# Patient Record
Sex: MALE | Race: WHITE | NOT HISPANIC OR LATINO | Employment: OTHER | URBAN - METROPOLITAN AREA
[De-identification: names, ages, dates, MRNs, and addresses within clinical notes are randomized per-mention and may not be internally consistent; named-entity substitution may affect disease eponyms.]

---

## 2019-07-17 ENCOUNTER — OFFICE VISIT (OUTPATIENT)
Dept: CARDIOLOGY | Facility: CLINIC | Age: 84
End: 2019-07-17

## 2019-07-17 VITALS
DIASTOLIC BLOOD PRESSURE: 64 MMHG | HEIGHT: 69 IN | WEIGHT: 172 LBS | SYSTOLIC BLOOD PRESSURE: 118 MMHG | HEART RATE: 71 BPM | BODY MASS INDEX: 25.48 KG/M2

## 2019-07-17 DIAGNOSIS — E78.2 MIXED HYPERLIPIDEMIA: ICD-10-CM

## 2019-07-17 DIAGNOSIS — I10 ESSENTIAL HYPERTENSION: Primary | ICD-10-CM

## 2019-07-17 DIAGNOSIS — E11.69 TYPE 2 DIABETES MELLITUS WITH OTHER SPECIFIED COMPLICATION, WITHOUT LONG-TERM CURRENT USE OF INSULIN (HCC): ICD-10-CM

## 2019-07-17 DIAGNOSIS — I49.1 PREMATURE ATRIAL CONTRACTIONS: ICD-10-CM

## 2019-07-17 PROBLEM — E11.9 DIABETES MELLITUS (HCC): Status: ACTIVE | Noted: 2019-07-17

## 2019-07-17 PROCEDURE — 93000 ELECTROCARDIOGRAM COMPLETE: CPT | Performed by: INTERNAL MEDICINE

## 2019-07-17 PROCEDURE — 99214 OFFICE O/P EST MOD 30 MIN: CPT | Performed by: INTERNAL MEDICINE

## 2019-07-17 RX ORDER — ROSUVASTATIN CALCIUM 20 MG/1
20 TABLET, COATED ORAL DAILY
COMMUNITY
Start: 2018-01-24

## 2019-07-17 RX ORDER — ASPIRIN 81 MG/1
81 TABLET ORAL DAILY
COMMUNITY
Start: 2017-01-26

## 2019-07-17 RX ORDER — TAMSULOSIN HYDROCHLORIDE 0.4 MG/1
0.4 CAPSULE ORAL DAILY
COMMUNITY
End: 2021-04-21

## 2019-07-17 RX ORDER — LISINOPRIL 5 MG/1
5 TABLET ORAL DAILY
COMMUNITY
Start: 2014-01-20 | End: 2022-04-27

## 2019-07-17 NOTE — PROGRESS NOTES
Date of Office Visit: 2019  Encounter Provider: Brayden Merino MD  Place of Service: [facility] CARDIOLOGY [place]  Patient Name: Pipe Pham  :1935  Joseph Garcia MD    Chief Complaint   Patient presents with   • Hypertension     1 year follow up     History of Present Illness:    I am pleased to see Mr. Pham in my office today as a follow-up.    As you know, patient is 84 years old white gentleman whose past medical history significant for hypertension, hyperlipidemia, diabetes mellitus, who came today for follow-up.    In , patient underwent cardiac catheterization which showed no significant CAD and LV function was normal.  Patient does not have previous history of CVA, congestive heart failure.  He does not abuse alcohol and tobacco.  In , patient underwent stress test in which he walked for total of 10 minutes and did not have any ischemia or myocardial infarction.  Patient was noted to have isolated PACs.    Since the previous visit, patient is doing fairly well.  He overall has slowed down a little bit because of his age.  He still does push lawnmower and does not reproduce any symptom of chest pain or tightness or heaviness.  No orthopnea, PND, syncope or presyncope.  No leg edema noted.    EKG showed normal sinus rhythm.  Isolated PACs noted.    Overall, I am very pleased with the patient progress.  His blood pressure is very well controlled.  At this stage I will continue current treatment.  I will see the patient as needed and patient is advised to follow-up with PCP.          Past Medical History:   Diagnosis Date   • Diabetes mellitus (CMS/HCC)    • Hyperlipidemia    • Hypertension    • Palpitations          Past Surgical History:   Procedure Laterality Date   • APPENDECTOMY             Current Outpatient Medications:   •  aspirin (ASPIR) 81 MG EC tablet, 81 mg Daily., Disp: , Rfl:   •  lisinopril (PRINIVIL,ZESTRIL) 5 MG tablet, 5 mg Daily., Disp: ,  "Rfl:   •  metFORMIN (GLUCOPHAGE) 500 MG tablet, 500 mg 2 (Two) Times a Day., Disp: , Rfl:   •  rosuvastatin (CRESTOR) 20 MG tablet, 20 mg Daily., Disp: , Rfl:   •  tamsulosin (FLOMAX) 0.4 MG capsule 24 hr capsule, 0.4 mg Daily., Disp: , Rfl:       Social History     Socioeconomic History   • Marital status:      Spouse name: Not on file   • Number of children: Not on file   • Years of education: Not on file   • Highest education level: Not on file   Tobacco Use   • Smoking status: Never Smoker   • Smokeless tobacco: Never Used   Substance and Sexual Activity   • Alcohol use: No     Frequency: Never   • Drug use: No   • Sexual activity: No         Review of Systems   Constitution: Negative for chills and fever.   HENT: Negative for ear discharge and nosebleeds.    Eyes: Negative for discharge and redness.   Cardiovascular: Negative for chest pain, orthopnea, palpitations, paroxysmal nocturnal dyspnea and syncope.   Respiratory: Negative for cough, shortness of breath and wheezing.    Endocrine: Negative for heat intolerance.   Skin: Negative for rash.   Musculoskeletal: Negative for arthritis and myalgias.   Gastrointestinal: Negative for abdominal pain, melena, nausea and vomiting.   Genitourinary: Negative for dysuria and hematuria.   Neurological: Negative for dizziness, light-headedness, numbness and tremors.   Psychiatric/Behavioral: Negative for depression. The patient is not nervous/anxious.        Procedures      ECG 12 Lead  Date/Time: 7/17/2019 10:58 AM  Performed by: Brayden Merino MD  Authorized by: Brayden Merino MD   Rhythm: sinus rhythm  Ectopy: atrial premature contractions  ST Segments: ST segments normal  T Waves: T waves normal    Clinical impression: normal ECG            ECG 12 Lead    (Results Pending)           Objective:    /64   Pulse 71   Ht 175.3 cm (69\")   Wt 78 kg (172 lb)   BMI 25.40 kg/m²         Physical Exam   Constitutional: He is oriented to person, place, and time. " He appears well-developed and well-nourished.   HENT:   Head: Normocephalic and atraumatic.   Eyes: Right eye exhibits no discharge. No scleral icterus.   Neck: No thyromegaly present.   Cardiovascular: Normal rate, regular rhythm and normal heart sounds. Exam reveals no gallop and no friction rub.   No murmur heard.  Pulmonary/Chest: Effort normal and breath sounds normal. No respiratory distress. He has no wheezes. He has no rales.   Abdominal: There is no tenderness.   Musculoskeletal: He exhibits no edema.   Lymphadenopathy:     He has no cervical adenopathy.   Neurological: He is alert and oriented to person, place, and time.   Skin: No rash noted. No erythema.   Psychiatric: He has a normal mood and affect.           Assessment:       Diagnosis Plan   1. Essential hypertension     2. Premature atrial contractions     3. Mixed hyperlipidemia     4. Type 2 diabetes mellitus with other specified complication, without long-term current use of insulin (CMS/[facility])              Plan:       I would recommend that patient continue current treatment.  His blood pressure is very well controlled.  I would recommend that patient should follow-up with PCP.  I will see the patient as needed.

## 2020-06-17 ENCOUNTER — TRANSCRIBE ORDERS (OUTPATIENT)
Dept: ADMINISTRATIVE | Facility: HOSPITAL | Age: 85
End: 2020-06-17

## 2020-06-17 ENCOUNTER — HOSPITAL ENCOUNTER (OUTPATIENT)
Dept: CARDIOLOGY | Facility: HOSPITAL | Age: 85
Discharge: HOME | End: 2020-06-17
Admitting: INTERNAL MEDICINE

## 2020-06-17 DIAGNOSIS — I82.409 ACUTE EMBOLISM AND THROMBOSIS OF DEEP VEIN OF LOWER EXTREMITY, UNSPECIFIED LATERALITY (HCC): ICD-10-CM

## 2020-06-17 DIAGNOSIS — I82.409 ACUTE EMBOLISM AND THROMBOSIS OF DEEP VEIN OF LOWER EXTREMITY, UNSPECIFIED LATERALITY (HCC): Primary | ICD-10-CM

## 2020-06-17 LAB
BH CV LOWER VASCULAR LEFT COMMON FEMORAL AUGMENT: NORMAL
BH CV LOWER VASCULAR LEFT COMMON FEMORAL COMPETENT: NORMAL
BH CV LOWER VASCULAR LEFT COMMON FEMORAL COMPRESS: NORMAL
BH CV LOWER VASCULAR LEFT COMMON FEMORAL PHASIC: NORMAL
BH CV LOWER VASCULAR LEFT COMMON FEMORAL SPONT: NORMAL
BH CV LOWER VASCULAR RIGHT COMMON FEMORAL AUGMENT: NORMAL
BH CV LOWER VASCULAR RIGHT COMMON FEMORAL COMPETENT: NORMAL
BH CV LOWER VASCULAR RIGHT COMMON FEMORAL COMPRESS: NORMAL
BH CV LOWER VASCULAR RIGHT COMMON FEMORAL PHASIC: NORMAL
BH CV LOWER VASCULAR RIGHT COMMON FEMORAL SPONT: NORMAL
BH CV LOWER VASCULAR RIGHT DISTAL FEMORAL COMPRESS: NORMAL
BH CV LOWER VASCULAR RIGHT GASTRONEMIUS COMPRESS: NORMAL
BH CV LOWER VASCULAR RIGHT GREATER SAPH AK COMPRESS: NORMAL
BH CV LOWER VASCULAR RIGHT GREATER SAPH BK COMPRESS: NORMAL
BH CV LOWER VASCULAR RIGHT LESSER SAPH COMPRESS: NORMAL
BH CV LOWER VASCULAR RIGHT MID FEMORAL AUGMENT: NORMAL
BH CV LOWER VASCULAR RIGHT MID FEMORAL COMPETENT: NORMAL
BH CV LOWER VASCULAR RIGHT MID FEMORAL COMPRESS: NORMAL
BH CV LOWER VASCULAR RIGHT MID FEMORAL PHASIC: NORMAL
BH CV LOWER VASCULAR RIGHT MID FEMORAL SPONT: NORMAL
BH CV LOWER VASCULAR RIGHT PERONEAL COMPRESS: NORMAL
BH CV LOWER VASCULAR RIGHT POPLITEAL AUGMENT: NORMAL
BH CV LOWER VASCULAR RIGHT POPLITEAL COMPETENT: NORMAL
BH CV LOWER VASCULAR RIGHT POPLITEAL COMPRESS: NORMAL
BH CV LOWER VASCULAR RIGHT POPLITEAL PHASIC: NORMAL
BH CV LOWER VASCULAR RIGHT POPLITEAL SPONT: NORMAL
BH CV LOWER VASCULAR RIGHT POSTERIOR TIBIAL COMPRESS: NORMAL
BH CV LOWER VASCULAR RIGHT PROXIMAL FEMORAL COMPRESS: NORMAL
BH CV LOWER VASCULAR RIGHT SAPHENOFEMORAL JUNCTION AUGMENT: NORMAL
BH CV LOWER VASCULAR RIGHT SAPHENOFEMORAL JUNCTION COMPETENT: NORMAL
BH CV LOWER VASCULAR RIGHT SAPHENOFEMORAL JUNCTION COMPRESS: NORMAL
BH CV LOWER VASCULAR RIGHT SAPHENOFEMORAL JUNCTION PHASIC: NORMAL
BH CV LOWER VASCULAR RIGHT SAPHENOFEMORAL JUNCTION SPONT: NORMAL
BH CV LOWER VASCULAR RIGHT VARICOSITY AK COMPRESS: NORMAL
BH CV LOWER VASCULAR RIGHT VARICOSITY BK COMPRESS: NORMAL

## 2020-06-17 PROCEDURE — 93971 EXTREMITY STUDY: CPT

## 2020-10-07 ENCOUNTER — TRANSCRIBE ORDERS (OUTPATIENT)
Dept: ADMINISTRATIVE | Facility: HOSPITAL | Age: 85
End: 2020-10-07

## 2020-10-07 ENCOUNTER — LAB (OUTPATIENT)
Dept: LAB | Facility: HOSPITAL | Age: 85
End: 2020-10-07

## 2020-10-07 DIAGNOSIS — M31.6 GCA (GIANT CELL ARTERITIS) (HCC): ICD-10-CM

## 2020-10-07 DIAGNOSIS — M31.6 GCA (GIANT CELL ARTERITIS) (HCC): Primary | ICD-10-CM

## 2020-10-07 LAB
BASOPHILS # BLD AUTO: 0 10*3/MM3 (ref 0–0.2)
BASOPHILS NFR BLD AUTO: 0.8 % (ref 0–1.5)
CRP SERPL-MCNC: 0.04 MG/DL (ref 0–0.5)
DEPRECATED RDW RBC AUTO: 44.2 FL (ref 37–54)
EOSINOPHIL # BLD AUTO: 0.1 10*3/MM3 (ref 0–0.4)
EOSINOPHIL NFR BLD AUTO: 2.4 % (ref 0.3–6.2)
ERYTHROCYTE [DISTWIDTH] IN BLOOD BY AUTOMATED COUNT: 13.6 % (ref 12.3–15.4)
ERYTHROCYTE [SEDIMENTATION RATE] IN BLOOD: 7 MM/HR (ref 0–20)
HCT VFR BLD AUTO: 40.1 % (ref 37.5–51)
HGB BLD-MCNC: 13.4 G/DL (ref 13–17.7)
LYMPHOCYTES # BLD AUTO: 1.2 10*3/MM3 (ref 0.7–3.1)
LYMPHOCYTES NFR BLD AUTO: 34.1 % (ref 19.6–45.3)
MCH RBC QN AUTO: 31 PG (ref 26.6–33)
MCHC RBC AUTO-ENTMCNC: 33.5 G/DL (ref 31.5–35.7)
MCV RBC AUTO: 92.6 FL (ref 79–97)
MONOCYTES # BLD AUTO: 0.4 10*3/MM3 (ref 0.1–0.9)
MONOCYTES NFR BLD AUTO: 10.6 % (ref 5–12)
NEUTROPHILS NFR BLD AUTO: 1.9 10*3/MM3 (ref 1.7–7)
NEUTROPHILS NFR BLD AUTO: 52.1 % (ref 42.7–76)
NRBC BLD AUTO-RTO: 0 /100 WBC (ref 0–0.2)
PLATELET # BLD AUTO: 209 10*3/MM3 (ref 140–450)
PMV BLD AUTO: 6.6 FL (ref 6–12)
RBC # BLD AUTO: 4.33 10*6/MM3 (ref 4.14–5.8)
WBC # BLD AUTO: 3.6 10*3/MM3 (ref 3.4–10.8)

## 2020-10-07 PROCEDURE — 85025 COMPLETE CBC W/AUTO DIFF WBC: CPT

## 2020-10-07 PROCEDURE — 36415 COLL VENOUS BLD VENIPUNCTURE: CPT

## 2020-10-07 PROCEDURE — 85652 RBC SED RATE AUTOMATED: CPT

## 2020-10-07 PROCEDURE — 86140 C-REACTIVE PROTEIN: CPT

## 2020-10-21 ENCOUNTER — HOSPITAL ENCOUNTER (OUTPATIENT)
Dept: RESPIRATORY THERAPY | Facility: HOSPITAL | Age: 85
Discharge: HOME | End: 2020-10-21
Admitting: INTERNAL MEDICINE

## 2020-10-21 ENCOUNTER — OFFICE VISIT (OUTPATIENT)
Dept: CARDIOLOGY | Facility: CLINIC | Age: 85
End: 2020-10-21

## 2020-10-21 VITALS
HEIGHT: 69 IN | WEIGHT: 173 LBS | HEART RATE: 70 BPM | BODY MASS INDEX: 25.62 KG/M2 | DIASTOLIC BLOOD PRESSURE: 62 MMHG | SYSTOLIC BLOOD PRESSURE: 118 MMHG

## 2020-10-21 DIAGNOSIS — I49.1 PREMATURE ATRIAL CONTRACTIONS: ICD-10-CM

## 2020-10-21 DIAGNOSIS — E11.65 TYPE 2 DIABETES MELLITUS WITH HYPERGLYCEMIA, UNSPECIFIED WHETHER LONG TERM INSULIN USE (HCC): ICD-10-CM

## 2020-10-21 DIAGNOSIS — I10 ESSENTIAL HYPERTENSION: Primary | ICD-10-CM

## 2020-10-21 DIAGNOSIS — R07.9 CHEST PAIN, UNSPECIFIED TYPE: ICD-10-CM

## 2020-10-21 DIAGNOSIS — E78.2 MIXED HYPERLIPIDEMIA: ICD-10-CM

## 2020-10-21 DIAGNOSIS — I10 ESSENTIAL HYPERTENSION: ICD-10-CM

## 2020-10-21 PROBLEM — Z01.810 ENCOUNTER FOR PREPROCEDURAL CARDIOVASCULAR EXAMINATION: Status: ACTIVE | Noted: 2018-01-24

## 2020-10-21 PROBLEM — N40.1 LOWER URINARY TRACT SYMPTOMS DUE TO BENIGN PROSTATIC HYPERPLASIA: Status: ACTIVE | Noted: 2019-12-06

## 2020-10-21 PROBLEM — Z87.442 HISTORY OF URINARY STONE: Status: ACTIVE | Noted: 2019-12-06

## 2020-10-21 PROBLEM — Z01.818 PREOPERATIVE EXAMINATION: Status: ACTIVE | Noted: 2017-01-26

## 2020-10-21 PROCEDURE — 93000 ELECTROCARDIOGRAM COMPLETE: CPT | Performed by: INTERNAL MEDICINE

## 2020-10-21 PROCEDURE — 93225 XTRNL ECG REC<48 HRS REC: CPT

## 2020-10-21 PROCEDURE — 99214 OFFICE O/P EST MOD 30 MIN: CPT | Performed by: INTERNAL MEDICINE

## 2020-10-21 NOTE — PROGRESS NOTES
Date of Office Visit: 10/21/2020  Encounter Provider: Dr. Brayden Merino  Place of Service: [facility] CARDIOLOGY [place]  Patient Name: Pipe Pham  :1935  Joseph Garcia MD    Chief Complaint   Patient presents with   • Hypertension     15 month follow up   • Hyperlipidemia   • Chest Pain   • Diabetes     History of Present Illness    I am pleased to see Mr. Pham in my office today as a follow-up.    As you know, patient is 85 years old white gentleman whose past medical history significant for hypertension, hyperlipidemia, diabetes mellitus, who came today for follow-up.    In , patient underwent cardiac catheterization which showed no significant CAD and LV function was normal.  Patient does not have previous history of CVA, congestive heart failure.  He does not abuse alcohol and tobacco.  In , patient underwent stress test in which he walked for total of 10 minutes and did not have any ischemia or myocardial infarction.  Patient was noted to have isolated PACs.    Patient came today for follow-up after 15 months.  Patient reports about 2 weeks ago he developed left cranial tenderness and headache.  It followed by loss of vision and left eyes.  Patient was seen by a retinal specialist and was thought to have amaurosis fugax.  Patient was recommended to have carotid Dopplers patient underwent carotid Dopplers and I do not have the report.  Patient is doing well right now.  Patient denies any symptom of chest pain or tightness or heaviness.  Patient denies any orthopnea or PND no syncope or presyncope.  No leg edema noted.  Patient does have palpitation due to skipping of the heartbeat.    EKG showed sinus rhythm.  Frequent PACs are noted.  Baseline artifact noted.  No change from previous EKG.    At this stage I would recommend to proceed with 48-hour Holter monitor.  I advised the patient to discuss with PCP for possible MRI of the brain.  If patient has CVA on brain  MRI, I would consider anticoagulation.  In the meanwhile I would do a Holter monitor to exclude paroxysmal atrial fibrillation.      Past Medical History:   Diagnosis Date   • Diabetes mellitus (CMS/HCC)    • Hyperlipidemia    • Hypertension    • Palpitations          Past Surgical History:   Procedure Laterality Date   • APPENDECTOMY             Current Outpatient Medications:   •  aspirin (ASPIR) 81 MG EC tablet, 81 mg Daily., Disp: , Rfl:   •  lisinopril (PRINIVIL,ZESTRIL) 5 MG tablet, 5 mg Daily., Disp: , Rfl:   •  Multiple Vitamins-Minerals (MULTIVITAMIN ADULTS PO), Take  by mouth Daily., Disp: , Rfl:   •  rosuvastatin (CRESTOR) 20 MG tablet, 20 mg Daily., Disp: , Rfl:   •  tamsulosin (FLOMAX) 0.4 MG capsule 24 hr capsule, 0.4 mg Daily., Disp: , Rfl:   •  Turmeric (QC TUMERIC COMPLEX PO), Take  by mouth 2 (two) times a day., Disp: , Rfl:       Social History     Socioeconomic History   • Marital status:      Spouse name: Not on file   • Number of children: Not on file   • Years of education: Not on file   • Highest education level: Not on file   Tobacco Use   • Smoking status: Never Smoker   • Smokeless tobacco: Never Used   Substance and Sexual Activity   • Alcohol use: No     Frequency: Never   • Drug use: No   • Sexual activity: Never         Review of Systems   Constitution: Negative for chills and fever.   HENT: Negative for ear discharge and nosebleeds.    Eyes: Negative for discharge and redness.   Cardiovascular: Negative for chest pain, orthopnea, palpitations, paroxysmal nocturnal dyspnea and syncope.   Respiratory: Positive for shortness of breath. Negative for cough and wheezing.    Endocrine: Negative for heat intolerance.   Skin: Negative for rash.   Musculoskeletal: Positive for arthritis and joint pain. Negative for myalgias.   Gastrointestinal: Negative for abdominal pain, melena, nausea and vomiting.   Genitourinary: Negative for dysuria and hematuria.   Neurological: Negative for  "dizziness, light-headedness, numbness and tremors.   Psychiatric/Behavioral: Negative for depression. The patient is not nervous/anxious.        Procedures    Procedures    ECG 12 Lead    (Results Pending)           Objective:    /62   Pulse 70   Ht 175.3 cm (69.02\")   Wt 78.5 kg (173 lb)   BMI 25.54 kg/m²         Constitutional:       Appearance: Well-developed.   Eyes:      General: No scleral icterus.        Right eye: No discharge.   HENT:      Head: Normocephalic and atraumatic.   Neck:      Thyroid: No thyromegaly.      Lymphadenopathy: No cervical adenopathy.   Pulmonary:      Effort: Pulmonary effort is normal. No respiratory distress.      Breath sounds: Normal breath sounds. No wheezing. No rales.   Cardiovascular:      Normal rate. Regular rhythm.      No gallop.   Abdominal:      Tenderness: There is no abdominal tenderness.   Skin:     Findings: No erythema or rash.   Neurological:      Mental Status: Alert and oriented to person, place, and time.             Assessment:       Diagnosis Plan   1. Essential hypertension  ECG 12 Lead   2. Premature atrial contractions  ECG 12 Lead   3. Mixed hyperlipidemia  ECG 12 Lead   4. Type 2 diabetes mellitus with hyperglycemia, unspecified whether long term insulin use (CMS/[facility])  ECG 12 Lead   5. Chest pain, unspecified type  ECG 12 Lead            Plan:       At this stage, I would recommend that patient should proceed with 48-hour Holter monitor.  Continue aspirin.  I would recommend that patient should see PCP for possible MRI of the brain.  I will see the patient in 6 months.  I will try to get the results of carotid Doppler.  "

## 2020-10-27 PROCEDURE — 93227 XTRNL ECG REC<48 HR R&I: CPT | Performed by: INTERNAL MEDICINE

## 2020-10-29 ENCOUNTER — TELEPHONE (OUTPATIENT)
Dept: CARDIOLOGY | Facility: CLINIC | Age: 85
End: 2020-10-29

## 2020-11-12 ENCOUNTER — OFFICE VISIT (OUTPATIENT)
Dept: CARDIOLOGY | Facility: CLINIC | Age: 85
End: 2020-11-12

## 2020-11-12 VITALS
WEIGHT: 176 LBS | DIASTOLIC BLOOD PRESSURE: 62 MMHG | HEIGHT: 69 IN | OXYGEN SATURATION: 98 % | HEART RATE: 72 BPM | BODY MASS INDEX: 26.07 KG/M2 | SYSTOLIC BLOOD PRESSURE: 120 MMHG

## 2020-11-12 DIAGNOSIS — R07.9 CHEST PAIN, UNSPECIFIED TYPE: ICD-10-CM

## 2020-11-12 DIAGNOSIS — E78.2 MIXED HYPERLIPIDEMIA: ICD-10-CM

## 2020-11-12 DIAGNOSIS — I49.1 PREMATURE ATRIAL CONTRACTIONS: ICD-10-CM

## 2020-11-12 DIAGNOSIS — I10 ESSENTIAL HYPERTENSION: Primary | ICD-10-CM

## 2020-11-12 PROBLEM — H18.599 DYSTROPHY OF ANTERIOR CORNEA: Status: ACTIVE | Noted: 2017-05-19

## 2020-11-12 PROBLEM — H04.129 TEAR FILM INSUFFICIENCY: Status: ACTIVE | Noted: 2017-06-16

## 2020-11-12 PROBLEM — H18.413 ARCUS SENILIS OF BOTH EYES: Status: ACTIVE | Noted: 2020-10-07

## 2020-11-12 PROBLEM — H26.491 PCO (POSTERIOR CAPSULAR OPACIFICATION), RIGHT: Status: ACTIVE | Noted: 2019-05-21

## 2020-11-12 PROBLEM — M31.6 TEMPORAL ARTERITIS (HCC): Status: ACTIVE | Noted: 2020-10-07

## 2020-11-12 PROBLEM — G45.3 AMAUROSIS FUGAX OF LEFT EYE: Status: ACTIVE | Noted: 2020-10-07

## 2020-11-12 PROCEDURE — 99213 OFFICE O/P EST LOW 20 MIN: CPT | Performed by: INTERNAL MEDICINE

## 2020-11-12 RX ORDER — VITAMIN B COMPLEX
1000 TABLET ORAL DAILY
COMMUNITY

## 2020-11-12 NOTE — PROGRESS NOTES
Date of Office Visit: 2020  Encounter Provider: Dr. Brayden Merino    Place of Service: [facility] CARDIOLOGY [place]  Patient Name: Pipe Pham  :1935  Joseph Garcia MD    Chief Complaint   Patient presents with   • Follow-up  Amaurosis fugax  PACs       holter     History of Present Illness    I am pleased to see Mr. Pham in my office today as a follow-up.    As you know, patient is 85 years old white gentleman whose past medical history significant for hypertension, hyperlipidemia, diabetes mellitus, who came today for follow-up.    In , patient underwent cardiac catheterization which showed no significant CAD and LV function was normal.  Patient does not have previous history of CVA, congestive heart failure.  He does not abuse alcohol and tobacco.  In , patient underwent stress test in which he walked for total of 10 minutes and did not have any ischemia or myocardial infarction.  Patient was noted to have isolated PACs.    In 2020, patient was presented to me when he had an episode of left-sided headache and scapular tenderness.  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  Carotid Doppler showed no significant carotid artery disease.  Patient underwent Holter monitor to exclude atrial tachyarrhythmia especially atrial fibrillation.    In 2020, repeat Holter monitor showed normal sinus rhythm without any significant bradycardia.  Isolated PACs were noted.  No atrial fibrillation was present.    Since the previous visit, patient is overall doing well.  Patient denies any symptom of palpitation.  No chest pain or tightness or heaviness.  No orthopnea PND no syncope or presyncope.  No leg edema.    Patient is noted to have frequent PACs.  Patient does not know about possibility of sleep apnea.  I had advised him to discuss with his wife to monitor his symptoms and sleep.  I  would recommend that patient should be excluded for sleep apnea.  In the meanwhile I will proceed with echocardiogram to exclude cardiomyopathy.  I would not proceed with any calcium channel blocker at present as patient is relatively asymptomatic.        Past Medical History:   Diagnosis Date   • Diabetes mellitus (CMS/HCC)    • Hyperlipidemia    • Hypertension    • Palpitations          Past Surgical History:   Procedure Laterality Date   • APPENDECTOMY             Current Outpatient Medications:   •  aspirin (ASPIR) 81 MG EC tablet, 81 mg Daily., Disp: , Rfl:   •  Cholecalciferol (Vitamin D) 25 MCG (1000 UT) tablet, Take 1,000 Units by mouth Daily., Disp: , Rfl:   •  lisinopril (PRINIVIL,ZESTRIL) 5 MG tablet, 5 mg Daily., Disp: , Rfl:   •  Multiple Vitamins-Minerals (MULTIVITAMIN ADULTS PO), Take  by mouth Daily., Disp: , Rfl:   •  rosuvastatin (CRESTOR) 20 MG tablet, 20 mg Daily., Disp: , Rfl:   •  tamsulosin (FLOMAX) 0.4 MG capsule 24 hr capsule, 0.4 mg Daily., Disp: , Rfl:   •  Turmeric (QC TUMERIC COMPLEX PO), Take  by mouth 2 (two) times a day., Disp: , Rfl:       Social History     Socioeconomic History   • Marital status:      Spouse name: Not on file   • Number of children: Not on file   • Years of education: Not on file   • Highest education level: Not on file   Tobacco Use   • Smoking status: Never Smoker   • Smokeless tobacco: Never Used   Substance and Sexual Activity   • Alcohol use: No     Frequency: Never   • Drug use: No   • Sexual activity: Never         Review of Systems   Constitution: Negative for chills and fever.   HENT: Negative for ear discharge and nosebleeds.    Eyes: Negative for discharge and redness.   Cardiovascular: Negative for chest pain, orthopnea, palpitations, paroxysmal nocturnal dyspnea and syncope.   Respiratory: Negative for cough, shortness of breath and wheezing.    Endocrine: Negative for heat intolerance.   Skin: Negative for rash.   Musculoskeletal: Negative for  "arthritis and myalgias.   Gastrointestinal: Negative for abdominal pain, melena, nausea and vomiting.   Genitourinary: Negative for dysuria and hematuria.   Neurological: Negative for dizziness, light-headedness, numbness and tremors.   Psychiatric/Behavioral: Negative for depression. The patient is not nervous/anxious.        Procedures    Procedures    No orders to display           Objective:    /62   Pulse 72   Ht 175.3 cm (69.02\")   Wt 79.8 kg (176 lb)   SpO2 98%   BMI 25.98 kg/m²         Constitutional:       Appearance: Well-developed.   Eyes:      General: No scleral icterus.        Right eye: No discharge.   HENT:      Head: Normocephalic and atraumatic.   Neck:      Thyroid: No thyromegaly.      Lymphadenopathy: No cervical adenopathy.   Pulmonary:      Effort: Pulmonary effort is normal. No respiratory distress.      Breath sounds: Normal breath sounds. No wheezing. No rales.   Cardiovascular:      Normal rate. Regular rhythm.      No gallop.   Abdominal:      Tenderness: There is no abdominal tenderness.   Skin:     Findings: No erythema or rash.   Neurological:      Mental Status: Alert and oriented to person, place, and time.             Assessment:       Diagnosis Plan   1. Essential hypertension  Adult Transthoracic Echo Complete W/ Cont if Necessary Per Protocol   2. Premature atrial contractions  Adult Transthoracic Echo Complete W/ Cont if Necessary Per Protocol   3. Mixed hyperlipidemia  Adult Transthoracic Echo Complete W/ Cont if Necessary Per Protocol   4. Chest pain, unspecified type  Adult Transthoracic Echo Complete W/ Cont if Necessary Per Protocol            Plan:       At this stage, I would recommend to proceed with echocardiogram.  If this is negative and LV function is preserved I would recommend observation.  "

## 2020-11-19 ENCOUNTER — HOSPITAL ENCOUNTER (OUTPATIENT)
Dept: CARDIOLOGY | Facility: HOSPITAL | Age: 85
Discharge: HOME | End: 2020-11-19
Admitting: INTERNAL MEDICINE

## 2020-11-19 VITALS
SYSTOLIC BLOOD PRESSURE: 134 MMHG | HEIGHT: 69 IN | WEIGHT: 176 LBS | BODY MASS INDEX: 26.07 KG/M2 | DIASTOLIC BLOOD PRESSURE: 57 MMHG

## 2020-11-19 DIAGNOSIS — E78.2 MIXED HYPERLIPIDEMIA: ICD-10-CM

## 2020-11-19 DIAGNOSIS — I49.1 PREMATURE ATRIAL CONTRACTIONS: ICD-10-CM

## 2020-11-19 DIAGNOSIS — R07.9 CHEST PAIN, UNSPECIFIED TYPE: ICD-10-CM

## 2020-11-19 DIAGNOSIS — I10 ESSENTIAL HYPERTENSION: ICD-10-CM

## 2020-11-19 PROCEDURE — 93306 TTE W/DOPPLER COMPLETE: CPT

## 2020-11-19 PROCEDURE — 93306 TTE W/DOPPLER COMPLETE: CPT | Performed by: INTERNAL MEDICINE

## 2020-11-28 LAB
BH CV ECHO MEAS - AO MAX PG (FULL): 2 MMHG
BH CV ECHO MEAS - AO MAX PG: 6.8 MMHG
BH CV ECHO MEAS - AO MEAN PG (FULL): 0.58 MMHG
BH CV ECHO MEAS - AO MEAN PG: 3.3 MMHG
BH CV ECHO MEAS - AO ROOT AREA (BSA CORRECTED): 1.7
BH CV ECHO MEAS - AO ROOT AREA: 8.3 CM^2
BH CV ECHO MEAS - AO ROOT DIAM: 3.3 CM
BH CV ECHO MEAS - AO V2 MAX: 130.9 CM/SEC
BH CV ECHO MEAS - AO V2 MEAN: 82.9 CM/SEC
BH CV ECHO MEAS - AO V2 VTI: 29.6 CM
BH CV ECHO MEAS - AVA(I,A): 2.3 CM^2
BH CV ECHO MEAS - AVA(I,D): 2.3 CM^2
BH CV ECHO MEAS - AVA(V,A): 2.1 CM^2
BH CV ECHO MEAS - AVA(V,D): 2.1 CM^2
BH CV ECHO MEAS - BSA(HAYCOCK): 2 M^2
BH CV ECHO MEAS - BSA: 1.9 M^2
BH CV ECHO MEAS - BZI_BMI: 24.4 KILOGRAMS/M^2
BH CV ECHO MEAS - BZI_METRIC_HEIGHT: 177.8 CM
BH CV ECHO MEAS - BZI_METRIC_WEIGHT: 77.1 KG
BH CV ECHO MEAS - EDV(CUBED): 107.7 ML
BH CV ECHO MEAS - EDV(MOD-SP2): 72.4 ML
BH CV ECHO MEAS - EDV(MOD-SP4): 103.2 ML
BH CV ECHO MEAS - EDV(TEICH): 105.3 ML
BH CV ECHO MEAS - EF(CUBED): 62.5 %
BH CV ECHO MEAS - EF(MOD-BP): 67 %
BH CV ECHO MEAS - EF(MOD-SP2): 70.6 %
BH CV ECHO MEAS - EF(MOD-SP4): 61.1 %
BH CV ECHO MEAS - EF(TEICH): 54 %
BH CV ECHO MEAS - ESV(CUBED): 40.4 ML
BH CV ECHO MEAS - ESV(MOD-SP2): 21.3 ML
BH CV ECHO MEAS - ESV(MOD-SP4): 40.1 ML
BH CV ECHO MEAS - ESV(TEICH): 48.5 ML
BH CV ECHO MEAS - FS: 27.9 %
BH CV ECHO MEAS - IVS/LVPW: 0.95
BH CV ECHO MEAS - IVSD: 1 CM
BH CV ECHO MEAS - LA DIMENSION(2D): 3.7 CM
BH CV ECHO MEAS - LV DIASTOLIC VOL/BSA (35-75): 53 ML/M^2
BH CV ECHO MEAS - LV MASS(C)D: 177.4 GRAMS
BH CV ECHO MEAS - LV MASS(C)DI: 91.1 GRAMS/M^2
BH CV ECHO MEAS - LV MAX PG: 4.8 MMHG
BH CV ECHO MEAS - LV MEAN PG: 2.7 MMHG
BH CV ECHO MEAS - LV SYSTOLIC VOL/BSA (12-30): 20.6 ML/M^2
BH CV ECHO MEAS - LV V1 MAX: 109.9 CM/SEC
BH CV ECHO MEAS - LV V1 MEAN: 77.6 CM/SEC
BH CV ECHO MEAS - LV V1 VTI: 26.3 CM
BH CV ECHO MEAS - LVIDD: 4.8 CM
BH CV ECHO MEAS - LVIDS: 3.4 CM
BH CV ECHO MEAS - LVOT AREA: 2.6 CM^2
BH CV ECHO MEAS - LVOT DIAM: 1.8 CM
BH CV ECHO MEAS - LVPWD: 1.1 CM
BH CV ECHO MEAS - MR MAX PG: 21.9 MMHG
BH CV ECHO MEAS - MR MAX VEL: 233.7 CM/SEC
BH CV ECHO MEAS - MV A MAX VEL: 76.1 CM/SEC
BH CV ECHO MEAS - MV DEC SLOPE: 257.3 CM/SEC^2
BH CV ECHO MEAS - MV DEC TIME: 0.23 SEC
BH CV ECHO MEAS - MV E MAX VEL: 58.9 CM/SEC
BH CV ECHO MEAS - MV E/A: 0.77
BH CV ECHO MEAS - MV MAX PG: 3.3 MMHG
BH CV ECHO MEAS - MV MEAN PG: 1.1 MMHG
BH CV ECHO MEAS - MV V2 MAX: 90.2 CM/SEC
BH CV ECHO MEAS - MV V2 MEAN: 49.1 CM/SEC
BH CV ECHO MEAS - MV V2 VTI: 22.2 CM
BH CV ECHO MEAS - MVA(VTI): 3 CM^2
BH CV ECHO MEAS - PA MAX PG (FULL): 3.5 MMHG
BH CV ECHO MEAS - PA MAX PG: 5.3 MMHG
BH CV ECHO MEAS - PA MEAN PG (FULL): 1.6 MMHG
BH CV ECHO MEAS - PA MEAN PG: 2.5 MMHG
BH CV ECHO MEAS - PA V2 MAX: 115.4 CM/SEC
BH CV ECHO MEAS - PA V2 MEAN: 72.4 CM/SEC
BH CV ECHO MEAS - PA V2 VTI: 22.3 CM
BH CV ECHO MEAS - PULM A REVS DUR: 0.13 SEC
BH CV ECHO MEAS - PULM A REVS VEL: 30 CM/SEC
BH CV ECHO MEAS - PULM DIAS VEL: 36.9 CM/SEC
BH CV ECHO MEAS - PULM S/D: 1.7
BH CV ECHO MEAS - PULM SYS VEL: 62.2 CM/SEC
BH CV ECHO MEAS - PVA(I,A): 2.3 CM^2
BH CV ECHO MEAS - PVA(I,D): 2.3 CM^2
BH CV ECHO MEAS - PVA(V,A): 2.4 CM^2
BH CV ECHO MEAS - PVA(V,D): 2.4 CM^2
BH CV ECHO MEAS - QP/QS: 0.77
BH CV ECHO MEAS - RV MAX PG: 1.8 MMHG
BH CV ECHO MEAS - RV MEAN PG: 0.99 MMHG
BH CV ECHO MEAS - RV V1 MAX: 67.1 CM/SEC
BH CV ECHO MEAS - RV V1 MEAN: 47.1 CM/SEC
BH CV ECHO MEAS - RV V1 VTI: 12.6 CM
BH CV ECHO MEAS - RVDD: 2.8 CM
BH CV ECHO MEAS - RVOT AREA: 4.1 CM^2
BH CV ECHO MEAS - RVOT DIAM: 2.3 CM
BH CV ECHO MEAS - SI(AO): 126.2 ML/M^2
BH CV ECHO MEAS - SI(CUBED): 34.6 ML/M^2
BH CV ECHO MEAS - SI(LVOT): 34.5 ML/M^2
BH CV ECHO MEAS - SI(MOD-SP2): 26.3 ML/M^2
BH CV ECHO MEAS - SI(MOD-SP4): 32.4 ML/M^2
BH CV ECHO MEAS - SI(TEICH): 29.2 ML/M^2
BH CV ECHO MEAS - SV(AO): 245.8 ML
BH CV ECHO MEAS - SV(CUBED): 67.3 ML
BH CV ECHO MEAS - SV(LVOT): 67.1 ML
BH CV ECHO MEAS - SV(MOD-SP2): 51.1 ML
BH CV ECHO MEAS - SV(MOD-SP4): 63.1 ML
BH CV ECHO MEAS - SV(RVOT): 51.4 ML
BH CV ECHO MEAS - SV(TEICH): 56.9 ML
BH CV ECHO MEAS - TR MAX VEL: 210 CM/SEC

## 2020-12-01 ENCOUNTER — TELEPHONE (OUTPATIENT)
Dept: CARDIOLOGY | Facility: CLINIC | Age: 85
End: 2020-12-01

## 2021-04-20 NOTE — PROGRESS NOTES
Date of Office Visit: 2021  Encounter Provider: Dr. Brayden Merino  Place of Service: [facility] CARDIOLOGY [place]  Patient Name: Pipe Pham  :1935  Joseph Garcia MD    Chief Complaint   Patient presents with   • Hypertension     6 month follow up echo/holter   • Hyperlipidemia   • Chest Pain   • Diabetes     History of Present Illness    I am pleased to see Mr. Pham in my office today as a follow-up.    As you know, patient is 85 years old white gentleman whose past medical history significant for hypertension, hyperlipidemia, diabetes mellitus, who came today for follow-up.    In , patient underwent cardiac catheterization which showed no significant CAD and LV function was normal.  Patient does not have previous history of CVA, congestive heart failure.  He does not abuse alcohol and tobacco.  In , patient underwent stress test in which he walked for total of 10 minutes and did not have any ischemia or myocardial infarction.  Patient was noted to have isolated PACs.    In 2020, patient was presented to me when he had an episode of left-sided headache and scapular tenderness.  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  Carotid Doppler showed no significant carotid artery disease.  Patient underwent Holter monitor to exclude atrial tachyarrhythmia especially atrial fibrillation.    In 2020, repeat Holter monitor showed normal sinus rhythm without any significant bradycardia.  Frequent isolated PACs in the range of 30,000 were noted.  No atrial fibrillation was present.  Medical treatment was recommended.  Echocardiogram showed preserved left ventricle function with EF of 60 to 65%.    This is 6 months follow-up.  Patient has done well through COVID-19 pandemic.  They did not get any infection.  Patient has received COVID-19 vaccination.  Patient denies any orthopnea PND or  syncope or presyncope.  Patient does not report palpitation.  No dizziness or lightheadedness.  No leg edema.    EKG showed sinus rhythm.  Isolated PACs noted.    Patient continues to do well clinically.  Patient does have isolated PACs but he is totally asymptomatic.  At this stage I would not start calcium channel blocker for suppression of PACs as patient is totally asymptomatic.  Continue to observe and repeat Holter monitor next year.  Possibility of sleep apnea is there but patient believes that he has good energy in the daytime.  He is very active.  Again observation is recommended at this stage.        Past Medical History:   Diagnosis Date   • Diabetes mellitus (CMS/HCC)    • Hyperlipidemia    • Hypertension    • Palpitations          Past Surgical History:   Procedure Laterality Date   • APPENDECTOMY             Current Outpatient Medications:   •  aspirin (ASPIR) 81 MG EC tablet, 81 mg Daily., Disp: , Rfl:   •  Cholecalciferol (Vitamin D) 25 MCG (1000 UT) tablet, Take 1,000 Units by mouth Daily., Disp: , Rfl:   •  lisinopril (PRINIVIL,ZESTRIL) 5 MG tablet, 5 mg Daily., Disp: , Rfl:   •  Multiple Vitamins-Minerals (MULTIVITAMIN ADULTS PO), Take  by mouth Daily., Disp: , Rfl:   •  rosuvastatin (CRESTOR) 20 MG tablet, 20 mg Daily., Disp: , Rfl:   •  Turmeric (QC TUMERIC COMPLEX PO), Take  by mouth 2 (two) times a day., Disp: , Rfl:       Social History     Socioeconomic History   • Marital status:      Spouse name: Not on file   • Number of children: Not on file   • Years of education: Not on file   • Highest education level: Not on file   Tobacco Use   • Smoking status: Never Smoker   • Smokeless tobacco: Never Used   Vaping Use   • Vaping Use: Never used   Substance and Sexual Activity   • Alcohol use: No   • Drug use: No   • Sexual activity: Never         Review of Systems   Constitutional: Negative for chills and fever.   HENT: Negative for ear discharge and nosebleeds.    Eyes: Negative for  "discharge and redness.   Cardiovascular: Negative for chest pain, orthopnea, palpitations, paroxysmal nocturnal dyspnea and syncope.   Respiratory: Negative for cough, shortness of breath and wheezing.    Endocrine: Negative for heat intolerance.   Skin: Negative for rash.   Musculoskeletal: Negative for arthritis and myalgias.   Gastrointestinal: Negative for abdominal pain, melena, nausea and vomiting.   Genitourinary: Negative for dysuria and hematuria.   Neurological: Negative for dizziness, light-headedness, numbness and tremors.   Psychiatric/Behavioral: Negative for depression. The patient is not nervous/anxious.        Procedures      ECG 12 Lead    Date/Time: 4/21/2021 9:58 AM  Performed by: Brayden Merino MD  Authorized by: Brayden Merino MD   Comparison: compared with previous ECG   Similar to previous ECG  Rhythm: sinus rhythm  Ectopy: atrial premature contractions    Clinical impression: abnormal EKG            ECG 12 Lead    (Results Pending)           Objective:    /78   Pulse 66   Ht 175.3 cm (69.02\")   Wt 83 kg (183 lb)   BMI 27.01 kg/m²         Constitutional:       Appearance: Well-developed.   Eyes:      General: No scleral icterus.        Right eye: No discharge.   HENT:      Head: Normocephalic and atraumatic.   Neck:      Thyroid: No thyromegaly.      Lymphadenopathy: No cervical adenopathy.   Pulmonary:      Effort: Pulmonary effort is normal. No respiratory distress.      Breath sounds: Normal breath sounds. No wheezing. No rales.   Cardiovascular:      Normal rate. Regular rhythm.      No gallop.   Abdominal:      Tenderness: There is no abdominal tenderness.   Skin:     Findings: No erythema or rash.   Neurological:      Mental Status: Alert and oriented to person, place, and time.             Assessment:       Diagnosis Plan   1. Essential hypertension  ECG 12 Lead   2. Mixed hyperlipidemia  ECG 12 Lead   3. Type 2 diabetes mellitus with other specified complication, without " long-term current use of insulin (CMS/[facility])  ECG 12 Lead   4. Chest pain, unspecified type  ECG 12 Lead            Plan:       MDM:    1.  Chest pain:    Patient has not had any further episode of chest pain    2.  PACs:    Patient had frequent PACs but he is totally asymptomatic.  I suspect these are probably due to sleep apnea.  Patient would monitor his sleep through his wife.  His echocardiogram showed normal left ventricle function and no significant valvular heart disease    3.  Hypertension:    Blood pressure is well controlled on lisinopril.    4.  Hyperlipidemia:    Patient is on Crestor.

## 2021-04-21 ENCOUNTER — OFFICE VISIT (OUTPATIENT)
Dept: CARDIOLOGY | Facility: CLINIC | Age: 86
End: 2021-04-21

## 2021-04-21 VITALS
HEART RATE: 66 BPM | WEIGHT: 183 LBS | DIASTOLIC BLOOD PRESSURE: 78 MMHG | HEIGHT: 69 IN | BODY MASS INDEX: 27.11 KG/M2 | SYSTOLIC BLOOD PRESSURE: 136 MMHG

## 2021-04-21 DIAGNOSIS — E78.2 MIXED HYPERLIPIDEMIA: ICD-10-CM

## 2021-04-21 DIAGNOSIS — I10 ESSENTIAL HYPERTENSION: Primary | ICD-10-CM

## 2021-04-21 DIAGNOSIS — R07.9 CHEST PAIN, UNSPECIFIED TYPE: ICD-10-CM

## 2021-04-21 DIAGNOSIS — E11.69 TYPE 2 DIABETES MELLITUS WITH OTHER SPECIFIED COMPLICATION, WITHOUT LONG-TERM CURRENT USE OF INSULIN (HCC): ICD-10-CM

## 2021-04-21 PROCEDURE — 99214 OFFICE O/P EST MOD 30 MIN: CPT | Performed by: INTERNAL MEDICINE

## 2021-04-21 PROCEDURE — 93000 ELECTROCARDIOGRAM COMPLETE: CPT | Performed by: INTERNAL MEDICINE

## 2022-04-25 PROBLEM — K21.9 GASTROESOPHAGEAL REFLUX DISEASE: Status: ACTIVE | Noted: 2020-12-31

## 2022-04-25 PROBLEM — M70.61 TROCHANTERIC BURSITIS OF RIGHT HIP: Status: ACTIVE | Noted: 2020-12-31

## 2022-04-25 PROBLEM — N40.0 BENIGN PROSTATIC HYPERPLASIA: Status: ACTIVE | Noted: 2020-12-31

## 2022-04-25 PROBLEM — N20.0 KIDNEY STONE: Status: ACTIVE | Noted: 2020-12-31

## 2022-04-26 NOTE — PROGRESS NOTES
Date of Office Visit: 2022  Encounter Provider: Dr. Brayden Merino  Place of Service: [facility] CARDIOLOGY [place]  Patient Name: Pipe Pham  :1935  Joseph Garcia MD    Chief Complaint   Patient presents with   • Hypertension   • Hyperlipidemia   • Diabetes   • Follow-up     History of Present Illness    I am pleased to see Mr. Pham in my office today as a follow-up.    As you know, patient is 86 years old white gentleman whose past medical history significant for hypertension, hyperlipidemia, diabetes mellitus, who came today for follow-up.    In , patient underwent cardiac catheterization which showed no significant CAD and LV function was normal.      In , patient underwent stress test in which he walked for total of 10 minutes and did not have any ischemia or myocardial infarction.  Patient was noted to have isolated PACs.    In 2020, patient presented to office when he had an episode of left-sided headache and scapular tenderness.  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  Carotid Doppler showed no significant carotid artery disease.  Patient underwent Holter monitor to exclude atrial tachyarrhythmia especially atrial fibrillation.    In 2020, repeat Holter monitor showed normal sinus rhythm without any significant bradycardia.  Frequent isolated PACs in the range of 30,000 were noted.  No atrial fibrillation was present.  Medical treatment was recommended.  Echocardiogram showed preserved left ventricle function with EF of 60 to 65%.    Patient denies any current or recent chest pain, dyspnea, PND, orthopnea, palpitations, near syncope, lower extremity edema or feelings of his heart racing.  He reports compliance with medical therapy.    EKG showed sinus rhythm.  Isolated PACs noted.          Past Medical History:   Diagnosis Date   • Diabetes mellitus (HCC)    •  Hyperlipidemia    • Hypertension    • Palpitations          Past Surgical History:   Procedure Laterality Date   • APPENDECTOMY             Current Outpatient Medications:   •  aspirin (aspirin) 81 MG EC tablet, 81 mg Daily., Disp: , Rfl:   •  Cholecalciferol (Vitamin D) 25 MCG (1000 UT) tablet, Take 1,000 Units by mouth Daily., Disp: , Rfl:   •  lisinopril (PRINIVIL,ZESTRIL) 10 MG tablet, lisinopril 10 mg tablet, Disp: , Rfl:   •  Multiple Vitamins-Minerals (MULTIVITAMIN ADULTS PO), Take  by mouth Daily., Disp: , Rfl:   •  rosuvastatin (CRESTOR) 20 MG tablet, 20 mg Daily., Disp: , Rfl:   •  Turmeric (QC TUMERIC COMPLEX PO), Take  by mouth 2 (two) times a day., Disp: , Rfl:       Social History     Socioeconomic History   • Marital status:    Tobacco Use   • Smoking status: Never Smoker   • Smokeless tobacco: Never Used   Vaping Use   • Vaping Use: Never used   Substance and Sexual Activity   • Alcohol use: No   • Drug use: No   • Sexual activity: Never         Review of Systems   Constitutional: Negative for decreased appetite and diaphoresis.   HENT: Negative for congestion, hearing loss and nosebleeds.    Cardiovascular: Negative for chest pain, claudication, dyspnea on exertion, irregular heartbeat, leg swelling, near-syncope, orthopnea, palpitations, paroxysmal nocturnal dyspnea and syncope.   Respiratory: Negative for cough, shortness of breath and sleep disturbances due to breathing.    Endocrine: Negative for polyuria.   Hematologic/Lymphatic: Does not bruise/bleed easily.   Skin: Negative for itching and rash.   Musculoskeletal: Negative for back pain, muscle weakness and myalgias.   Gastrointestinal: Negative for abdominal pain, change in bowel habit and nausea.   Genitourinary: Negative for dysuria, flank pain, frequency and hesitancy.   Neurological: Negative for dizziness, tremors and weakness.   Psychiatric/Behavioral: Negative for altered mental status. The patient does not have insomnia.   "      Procedures      ECG 12 Lead    Date/Time: 4/27/2022 12:43 PM  Performed by: Mamta Lauren APRN  Authorized by: Mamta Lauren APRN   Rhythm: sinus rhythm  Ectopy: atrial premature contractions  Rate: normal  BPM: 68  Conduction: conduction normal  ST Segments: ST segments normal  T Waves: T waves normal  QRS axis: normal  Other: no other findings    Clinical impression: non-specific ECG            ECG 12 Lead    (Results Pending)           Objective:    /64 (BP Location: Left arm, Cuff Size: Adult)   Pulse 68   Ht 175.3 cm (69.02\")   Wt 81.6 kg (180 lb)   SpO2 98%   BMI 26.57 kg/m²         Physical Exam        Assessment:       Diagnosis Plan   1. Mixed hyperlipidemia      Treated with statin therapy   2. Essential hypertension      Stable on current medical therapy   3. Premature atrial contractions              Plan:       Blood pressures stable on current medical therapy    No signs or symptoms of acute cardiac decompensation.  EKG shows sinus rhythm with isolated PACs.    I made no changes in his medication we will continue the current treatment.  He will have scheduled follow-up with Dr. Merino in 6 months.  If there is any new or worsening problems of asked him to contact the office sooner  "

## 2022-04-27 ENCOUNTER — OFFICE VISIT (OUTPATIENT)
Dept: CARDIOLOGY | Facility: CLINIC | Age: 87
End: 2022-04-27

## 2022-04-27 VITALS
BODY MASS INDEX: 26.66 KG/M2 | HEIGHT: 69 IN | WEIGHT: 180 LBS | HEART RATE: 68 BPM | SYSTOLIC BLOOD PRESSURE: 112 MMHG | OXYGEN SATURATION: 98 % | DIASTOLIC BLOOD PRESSURE: 64 MMHG

## 2022-04-27 DIAGNOSIS — I49.1 PREMATURE ATRIAL CONTRACTIONS: ICD-10-CM

## 2022-04-27 DIAGNOSIS — E78.2 MIXED HYPERLIPIDEMIA: Primary | ICD-10-CM

## 2022-04-27 DIAGNOSIS — I10 ESSENTIAL HYPERTENSION: ICD-10-CM

## 2022-04-27 PROCEDURE — 99213 OFFICE O/P EST LOW 20 MIN: CPT | Performed by: NURSE PRACTITIONER

## 2022-04-27 PROCEDURE — 93000 ELECTROCARDIOGRAM COMPLETE: CPT | Performed by: NURSE PRACTITIONER

## 2022-04-27 RX ORDER — LISINOPRIL 10 MG/1
TABLET ORAL
COMMUNITY

## 2023-04-25 NOTE — PROGRESS NOTES
Date of Office Visit: 2023  Encounter Provider: Dr. Brayden Merino  Place of Service: [facility] CARDIOLOGY [place]  Patient Name: Pipe Pham  :1935  Joseph Garcia MD    Chief Complaint   Patient presents with   • Hypertension   • Palpitations   • Hyperlipidemia   • Diabetes   • Follow-up     History of Present Illness    I am pleased to see Mr. Pham in my office today as a follow-up.    As you know, patient is 87 years old white gentleman whose past medical history significant for hypertension, hyperlipidemia, diabetes mellitus, who came today for follow-up.    In , patient underwent cardiac catheterization which showed no significant CAD and LV function was normal.      In , patient underwent stress test in which he walked for total of 10 minutes and did not have any ischemia or myocardial infarction.  Patient was noted to have isolated PACs.    In 2020, patient presented to office when he had an episode of left-sided headache and scapular tenderness.  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  Carotid Doppler showed no significant carotid artery disease.  Patient underwent Holter monitor to exclude atrial tachyarrhythmia especially atrial fibrillation.    In 2020, repeat Holter monitor showed normal sinus rhythm without any significant bradycardia.  Frequent isolated PACs in the range of 30,000 were noted.  No atrial fibrillation was present.  Medical treatment was recommended.  Echocardiogram showed preserved left ventricle function with EF of 60 to 65%.    Since the previous visit, patient is overall doing fairly well from cardiovascular standpoint.  Patient denies any symptom of chest pain or tightness or heaviness.  No orthopnea PND no syncope or presyncope.  No leg edema noted.    EKG showed sinus rhythm with isolated PACs.    At this stage, I would recommend that  patient should monitor the blood pressure at home and bring the logbook on next visit.  His symptom of palpitations are contained.  Blood pressure is slightly high but desirable.  Previously all blood pressure at home are within desirable range.  Patient had isolated PVCs.        Past Medical History:   Diagnosis Date   • Diabetes mellitus    • Hyperlipidemia    • Hypertension    • Palpitations          Past Surgical History:   Procedure Laterality Date   • APPENDECTOMY             Current Outpatient Medications:   •  aspirin (aspirin) 81 MG EC tablet, 1 tablet Daily., Disp: , Rfl:   •  Cholecalciferol (Vitamin D) 25 MCG (1000 UT) tablet, Take 1 tablet by mouth Daily., Disp: , Rfl:   •  lisinopril (PRINIVIL,ZESTRIL) 10 MG tablet, lisinopril 10 mg tablet, Disp: , Rfl:   •  Multiple Vitamins-Minerals (MULTIVITAMIN ADULTS PO), Take  by mouth Daily., Disp: , Rfl:   •  rosuvastatin (CRESTOR) 20 MG tablet, 1 tablet Daily., Disp: , Rfl:   •  Turmeric (QC TUMERIC COMPLEX PO), Take  by mouth 2 (two) times a day., Disp: , Rfl:       Social History     Socioeconomic History   • Marital status:    Tobacco Use   • Smoking status: Never   • Smokeless tobacco: Never   Vaping Use   • Vaping Use: Never used   Substance and Sexual Activity   • Alcohol use: No   • Drug use: No   • Sexual activity: Never         Review of Systems   Constitutional: Negative for chills and fever.   HENT: Negative for ear discharge and nosebleeds.    Eyes: Negative for discharge and redness.   Cardiovascular: Negative for chest pain, orthopnea, palpitations, paroxysmal nocturnal dyspnea and syncope.   Respiratory: Negative for cough, shortness of breath and wheezing.    Endocrine: Negative for heat intolerance.   Skin: Negative for rash.   Musculoskeletal: Negative for arthritis and myalgias.   Gastrointestinal: Negative for abdominal pain, melena, nausea and vomiting.   Genitourinary: Negative for dysuria and hematuria.   Neurological: Negative  "for dizziness, light-headedness, numbness and tremors.   Psychiatric/Behavioral: Negative for depression. The patient is not nervous/anxious.        Procedures      ECG 12 Lead    Date/Time: 4/26/2023 9:50 AM  Performed by: Brayden Merino MD  Authorized by: Brayden Merino MD   Comparison: compared with previous ECG   Similar to previous ECG  Rhythm: sinus rhythm  Ectopy: atrial premature contractions    Clinical impression: abnormal EKG            ECG 12 Lead    (Results Pending)           Objective:    /70   Pulse 73   Ht 175.3 cm (69.02\")   Wt 78 kg (172 lb)   SpO2 99%   BMI 25.39 kg/m²         Constitutional:       Appearance: Well-developed.   Eyes:      General: No scleral icterus.        Right eye: No discharge.   HENT:      Head: Normocephalic and atraumatic.   Neck:      Thyroid: No thyromegaly.      Lymphadenopathy: No cervical adenopathy.   Pulmonary:      Effort: Pulmonary effort is normal. No respiratory distress.      Breath sounds: Normal breath sounds. No wheezing. No rales.   Cardiovascular:      Normal rate. Regular rhythm.      No gallop.   Abdominal:      Tenderness: There is no abdominal tenderness.   Skin:     Findings: No erythema or rash.   Neurological:      Mental Status: Alert and oriented to person, place, and time.             Assessment:       Diagnosis Plan   1. Essential hypertension  ECG 12 Lead      2. Mixed hyperlipidemia  ECG 12 Lead      3. Type 2 diabetes mellitus with other specified complication, without long-term current use of insulin  ECG 12 Lead               Plan:       MDM:    1.  Hypertension:    Blood pressure was slightly elevated but all blood pressure readings at home are desirable.  Recommend observation    2.  Hyperlipidemia:    Patient is on Crestor.  Recommend repeat lipid panel testing    3.  PACs:    Patient has few isolated PVCs but no atrial fibrillation.  Continue to observe  "

## 2023-04-26 ENCOUNTER — OFFICE VISIT (OUTPATIENT)
Dept: CARDIOLOGY | Facility: CLINIC | Age: 88
End: 2023-04-26
Payer: MEDICARE

## 2023-04-26 VITALS
SYSTOLIC BLOOD PRESSURE: 129 MMHG | HEIGHT: 69 IN | HEART RATE: 73 BPM | BODY MASS INDEX: 25.48 KG/M2 | OXYGEN SATURATION: 99 % | WEIGHT: 172 LBS | DIASTOLIC BLOOD PRESSURE: 70 MMHG

## 2023-04-26 DIAGNOSIS — E11.69 TYPE 2 DIABETES MELLITUS WITH OTHER SPECIFIED COMPLICATION, WITHOUT LONG-TERM CURRENT USE OF INSULIN: ICD-10-CM

## 2023-04-26 DIAGNOSIS — E78.2 MIXED HYPERLIPIDEMIA: ICD-10-CM

## 2023-04-26 DIAGNOSIS — I10 ESSENTIAL HYPERTENSION: Primary | ICD-10-CM

## 2023-04-26 PROCEDURE — 3078F DIAST BP <80 MM HG: CPT | Performed by: INTERNAL MEDICINE

## 2023-04-26 PROCEDURE — 99214 OFFICE O/P EST MOD 30 MIN: CPT | Performed by: INTERNAL MEDICINE

## 2023-04-26 PROCEDURE — 1159F MED LIST DOCD IN RCRD: CPT | Performed by: INTERNAL MEDICINE

## 2023-04-26 PROCEDURE — 1160F RVW MEDS BY RX/DR IN RCRD: CPT | Performed by: INTERNAL MEDICINE

## 2023-04-26 PROCEDURE — 93000 ELECTROCARDIOGRAM COMPLETE: CPT | Performed by: INTERNAL MEDICINE

## 2023-04-26 PROCEDURE — 3074F SYST BP LT 130 MM HG: CPT | Performed by: INTERNAL MEDICINE

## 2024-05-28 ENCOUNTER — OFFICE VISIT (OUTPATIENT)
Dept: SURGERY | Facility: CLINIC | Age: 89
End: 2024-05-28
Payer: COMMERCIAL

## 2024-05-28 VITALS
TEMPERATURE: 98.4 F | DIASTOLIC BLOOD PRESSURE: 56 MMHG | RESPIRATION RATE: 18 BRPM | SYSTOLIC BLOOD PRESSURE: 162 MMHG | OXYGEN SATURATION: 99 % | BODY MASS INDEX: 24.23 KG/M2 | HEIGHT: 69 IN | HEART RATE: 67 BPM | WEIGHT: 163.6 LBS

## 2024-05-28 DIAGNOSIS — K40.90 NON-RECURRENT UNILATERAL INGUINAL HERNIA WITHOUT OBSTRUCTION OR GANGRENE: Primary | ICD-10-CM

## 2024-05-28 PROCEDURE — 99204 OFFICE O/P NEW MOD 45 MIN: CPT | Performed by: SURGERY

## 2024-05-28 NOTE — PROGRESS NOTES
"Subjective   Pipe Pham is a 88 y.o. male.   Chief Complaint   Patient presents with    Hernia     New pt ref Dr. Garcia, Rt Ing Hernia      /56 (BP Location: Right arm, Patient Position: Sitting, Cuff Size: Adult)   Pulse 67   Temp 98.4 °F (36.9 °C) (Infrared)   Resp 18   Ht 175.3 cm (69\")   Wt 74.2 kg (163 lb 9.6 oz)   SpO2 99%   BMI 24.16 kg/m²     HISTORY OF PRESENT ILLNESS:  88-year-old gentleman sent over to me with a 1 week history of right groin bulging.  He says that that he first noticed it last week he thought he had a small fatty tumor in that location in the past and not really changed.  He is tolerating diet having regular bowel function not have any pain but he self diagnosed himself with a hernia then saw his primary care provider who agreed and was sent over for evaluation.  Never had surgery in this location before.  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      Cholecalciferol (Vitamin D) 25 MCG (1000 UT) tablet Take 1 tablet by mouth Daily.      clotrimazole-betamethasone (LOTRISONE) 1-0.05 % cream Apply 1 Application topically to the appropriate area as directed 2 (Two) Times a Day for 14 days. 28 g 0    lisinopril (PRINIVIL,ZESTRIL) 10 MG tablet lisinopril 10 mg tablet      Multiple Vitamins-Minerals (MULTIVITAMIN ADULTS PO) Take  by mouth Daily.      rosuvastatin (CRESTOR) 20 MG tablet 1 tablet Daily.      Turmeric (QC TUMERIC COMPLEX PO) Take  by mouth 2 (two) times a day.       No facility-administered encounter medications on file as of 5/28/2024.     Past Medical History:   Diagnosis Date    Colon polyp 1970    Diabetes mellitus     Hyperlipidemia     Hypertension     Infectious viral hepatitis 1956    Food [employer]    " Palpitations     Pancreatitis 1956    Food bourne     Past Surgical History:   Procedure Laterality Date    APPENDECTOMY      EYE SURGERY  2001    Cataract Removal Both Eyes and new lens inserts     Family History   Problem Relation Age of Onset    Heart failure Father     Heart disease Father          age 66 in 1946       Social History     Tobacco Use    Smoking status: Never     Passive exposure: Never    Smokeless tobacco: Never   Vaping Use    Vaping status: Never Used   Substance Use Topics    Alcohol use: Never    Drug use: Never     Patient has no known allergies.    Review of Systems  Complete review of systems been obtained is positive for tinnitus rash and urinary urgency the remainder of the review of systems is negative  Objective     Physical Exam  Constitutional:       Appearance: Normal appearance.   HENT:      Head: Normocephalic and atraumatic.   Cardiovascular:      Rate and Rhythm: Normal rate.   Pulmonary:      Effort: Pulmonary effort is normal. No respiratory distress.   Abdominal:      General: There is no distension.      Palpations: Abdomen is soft.   Genitourinary:     Comments: In the right groin there is bulging with Valsalva that is reducible gentleman epilation no significant bulging in the left groin  Neurological:      General: No focal deficit present.      Mental Status: He is alert. Mental status is at baseline.   Psychiatric:         Mood and Affect: Mood normal.         Behavior: Behavior normal.           Assessment & Plan   Diagnoses and all orders for this visit:    1. Non-recurrent unilateral inguinal hernia without obstruction or gangrene (Primary)    I counseled about the anticipated diagnosis of right inguinal hernia.  We talked about the natural history of hernias and nonoperative management.  We talked about when to seek mediate medical attention.  We talked about the steps of an operation anticipated recover the possible complication we talked about the risk and  benefits of the use of mesh.  We talked about chronic pain.  After discussion of the risk and benefits of surgery he is unsure whether not he wants to move forward with the operation.  I would propose to do a laparoscopic robotic assisted approach.  He is can to call in the near future and let me know if he wants to have surgery follow-up in the near future for reexamination or follow-up as needed.    Anibal Freitas MD  5/28/2024  8:50 AM EDT    This note was created using Dragon Voice Recognition software.

## 2024-06-05 ENCOUNTER — PATIENT ROUNDING (BHMG ONLY) (OUTPATIENT)
Dept: SURGERY | Facility: CLINIC | Age: 89
End: 2024-06-05
Payer: MEDICARE

## 2024-06-05 NOTE — PROGRESS NOTES
June 5, 2024    Hello, may I speak with Pipe hPam?    My name is Alee       I am  with [facility]  [address] [place] [address].    Before we get started may I verify your date of birth? 1935    I am calling to officially welcome you to our practice and ask about your recent visit. Is this a good time to talk? yes    Tell me about your visit with us. What things went well?  patient stated very good experience and that Dr. Freitas took time explaining everything to him and he really appreciated that.        We're always looking for ways to make our patients' experiences even better. Do you have recommendations on ways we may improve?  no    Overall were you satisfied with your first visit to our practice? yes       I appreciate you taking the time to speak with me today. Is there anything else I can do for you? no      Thank you, and have a great day.

## 2024-06-12 NOTE — PROGRESS NOTES
Date of Office Visit: 2024  Encounter Provider: Dr. Brayden Merino  Place of Service: [facility] CARDIOLOGY [place]  Patient Name: Pipe Pham  :1935  Joseph Garcia MD    Chief Complaint   Patient presents with    Hypertension    Hyperlipidemia    Diabetes    Follow-up     History of Present Illness    I am pleased to see Mr. Pham in my office today as a follow-up.    As you know, patient is 88 years old white gentleman whose past medical history significant for hypertension, hyperlipidemia, diabetes mellitus, who came today for follow-up.    In , patient underwent cardiac catheterization which showed no significant CAD and LV function was normal.      In , patient underwent stress test in which he walked for total of 10 minutes and did not have any ischemia or myocardial infarction.  Patient was noted to have isolated PACs.    In 2020, patient presented to office when he had an episode of left-sided headache and scapular tenderness.  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  Carotid Doppler showed no significant carotid artery disease.  Patient underwent Holter monitor to exclude atrial tachyarrhythmia especially atrial fibrillation.    In 2020, repeat Holter monitor showed normal sinus rhythm without any significant bradycardia.  Frequent isolated PACs in the range of 30,000 were noted.  No atrial fibrillation was present.  Medical treatment was recommended.  Echocardiogram showed preserved left ventricle function with EF of 60 to 65%.    Since the previous visit, patient is diagnosed with right inguinal hernia.  Patient is being considered for possible surgery but patient has deferred it for now.  Patient denies any chest pain or tightness or heaviness.  No orthopnea PND no syncope or presyncope.  No leg edema noted.    I discussed with the patient if he needs inguinal  hernia surgery he needs to notify my office sooner than later.  Patient would need a stress test at that time.  His symptom of palpitations are contained blood pressure is desirable I will see the patient in a year or as needed.      Past Medical History:   Diagnosis Date    Colon polyp 1970    Diabetes mellitus     Hyperlipidemia     Hypertension     Infectious viral hepatitis 1956    [employer]    Palpitations     Pancreatitis 1956    Food bour[employer]         Past Surgical History:   Procedure Laterality Date    APPENDECTOMY      EYE SURGERY  2001    Cataract Removal Both Eyes and new lens inserts           Current Outpatient Medications:     aspirin (aspirin) 81 MG EC tablet, 1 tablet Daily., Disp: , Rfl:     Cholecalciferol (Vitamin D) 25 MCG (1000 UT) tablet, Take 1 tablet by mouth Daily., Disp: , Rfl:     lisinopril (PRINIVIL,ZESTRIL) 10 MG tablet, lisinopril 10 mg tablet, Disp: , Rfl:     Multiple Vitamins-Minerals (MULTIVITAMIN ADULTS PO), Take  by mouth Daily., Disp: , Rfl:     rosuvastatin (CRESTOR) 20 MG tablet, 1 tablet Daily., Disp: , Rfl:     Turmeric (QC TUMERIC COMPLEX PO), Take  by mouth 2 (two) times a day., Disp: , Rfl:       Social History     Socioeconomic History    Marital status:    Tobacco Use    Smoking status: Never     Passive exposure: Never    Smokeless tobacco: Never   Vaping Use    Vaping status: Never Used   Substance and Sexual Activity    Alcohol use: Never    Drug use: Never    Sexual activity: Yes     Partners: Female     Birth control/protection: Coitus interruptus         Review of Systems   Constitutional: Negative for chills and fever.   HENT:  Negative for ear discharge and nosebleeds.    Eyes:  Negative for discharge and redness.   Cardiovascular:  Negative for chest pain, orthopnea, palpitations, paroxysmal nocturnal dyspnea and syncope.   Respiratory:  Positive for shortness of breath. Negative for cough and wheezing.    Endocrine: Negative for heat intolerance.  "  Skin:  Negative for rash.   Musculoskeletal:  Negative for arthritis and myalgias.   Gastrointestinal:  Negative for abdominal pain, melena, nausea and vomiting.   Genitourinary:  Negative for dysuria and hematuria.   Neurological:  Negative for dizziness, light-headedness, numbness and tremors.   Psychiatric/Behavioral:  Negative for depression. The patient is not nervous/anxious.        Procedures      ECG 12 Lead    Date/Time: 6/13/2024 5:16 PM  Performed by: Brayden Merino MD    Authorized by: Brayden Merino MD  Comparison: compared with previous ECG   Similar to previous ECG  Rhythm: sinus rhythm  Ectopy: atrial premature contractions    Clinical impression: abnormal EKG          ECG 12 Lead    (Results Pending)           Objective:    /68 (BP Location: Right arm, Patient Position: Sitting, Cuff Size: Adult)   Pulse 78   Resp 16   Ht 175.3 cm (69.02\")   Wt 72.6 kg (160 lb)   SpO2 96%   BMI 23.62 kg/m²         Constitutional:       Appearance: Well-developed.   Eyes:      General: No scleral icterus.        Right eye: No discharge.   HENT:      Head: Normocephalic and atraumatic.   Neck:      Thyroid: No thyromegaly.      Lymphadenopathy: No cervical adenopathy.   Pulmonary:      Effort: Pulmonary effort is normal. No respiratory distress.      Breath sounds: Normal breath sounds. No wheezing. No rales.   Cardiovascular:      Normal rate. Regular rhythm.      No gallop.    Edema:     Peripheral edema absent.   Abdominal:      Tenderness: There is no abdominal tenderness.   Skin:     Findings: No erythema or rash.   Neurological:      Mental Status: Alert and oriented to person, place, and time.             Assessment:       Diagnosis Plan   1. Premature atrial contractions  ECG 12 Lead      2. Essential hypertension  ECG 12 Lead      3. Mixed hyperlipidemia  ECG 12 Lead               Plan:       MDM:    1.  Palpitation:    Symptom of palpitations are contained I would recommend observation PACs are " far and few.    2.  Preop clearance:    Patient has right inguinal hernia.  He is being considered for surgery but he has deferred it for now.  If patient proceed with surgery he would need stress test    3.  Hypertension:    Blood pressure is controlled current treatment with lisinopril will be continued    4.  Mixed hyperlipidemia.    Current treatment with Crestor would be continued

## 2024-06-13 ENCOUNTER — OFFICE VISIT (OUTPATIENT)
Dept: CARDIOLOGY | Facility: CLINIC | Age: 89
End: 2024-06-13
Payer: MEDICARE

## 2024-06-13 VITALS
RESPIRATION RATE: 16 BRPM | SYSTOLIC BLOOD PRESSURE: 137 MMHG | OXYGEN SATURATION: 96 % | WEIGHT: 160 LBS | HEART RATE: 78 BPM | HEIGHT: 69 IN | BODY MASS INDEX: 23.7 KG/M2 | DIASTOLIC BLOOD PRESSURE: 68 MMHG

## 2024-06-13 DIAGNOSIS — E78.2 MIXED HYPERLIPIDEMIA: ICD-10-CM

## 2024-06-13 DIAGNOSIS — I49.1 PREMATURE ATRIAL CONTRACTIONS: Primary | ICD-10-CM

## 2024-06-13 DIAGNOSIS — I10 ESSENTIAL HYPERTENSION: ICD-10-CM

## 2024-06-13 PROCEDURE — 99214 OFFICE O/P EST MOD 30 MIN: CPT | Performed by: INTERNAL MEDICINE

## 2024-06-13 PROCEDURE — 1160F RVW MEDS BY RX/DR IN RCRD: CPT | Performed by: INTERNAL MEDICINE

## 2024-06-13 PROCEDURE — 1159F MED LIST DOCD IN RCRD: CPT | Performed by: INTERNAL MEDICINE

## 2024-06-13 PROCEDURE — 93000 ELECTROCARDIOGRAM COMPLETE: CPT | Performed by: INTERNAL MEDICINE

## 2024-10-21 NOTE — PROGRESS NOTES
General Surgery History and Physical      Referring Provider: No ref. provider found    Chief Complaint:    Right inguinal hernia    History of Present Illness:    Pipe Pham is a 89 y.o. male previously seen in the clinic by Dr. Freitas for a right inguinal hernia.  He was last seen in May and presents for reevaluation.  He is interested in surgery at this time as it has increased in size.  Denies any pain but does note discomfort in the area.  Due to increase in size the patient is interested in repair at this time.  Of note he does follow with Dr. Merino and per prior notes he will need stress test prior to surgery.  The patient does have a history of a laparotomy for bowel obstruction secondary to colonic polyp.    Past Medical History:   Past Medical History:   Diagnosis Date    Colon polyp     Diabetes mellitus     Hyperlipidemia     Hypertension     Infectious viral hepatitis     Food Bourne    Palpitations     Pancreatitis     Food bourne      Past Surgical History:    Past Surgical History:   Procedure Laterality Date    APPENDECTOMY      EXPLORATORY LAPAROTOMY W/ BOWEL RESECTION      EYE SURGERY      Cataract Removal Both Eyes and new lens inserts     Family History:    Family History   Problem Relation Age of Onset    Heart failure Father     Heart disease Father          age 66 in [birth year]     Social History:    Social History     Socioeconomic History    Marital status:    Tobacco Use    Smoking status: Never     Passive exposure: Never    Smokeless tobacco: Never   Vaping Use    Vaping status: Never Used   Substance and Sexual Activity    Alcohol use: Never    Drug use: Never    Sexual activity: Yes     Partners: Female     Birth control/protection: Coitus interruptus     Allergies:   No Known Allergies    Medications:     Current Outpatient Medications:     aspirin (aspirin) 81 MG EC tablet, 1 tablet Daily., Disp: , Rfl:     Cholecalciferol (Vitamin D) 25 MCG (1000 UT)  tablet, Take 1 tablet by mouth Daily., Disp: , Rfl:     lisinopril (PRINIVIL,ZESTRIL) 10 MG tablet, lisinopril 10 mg tablet, Disp: , Rfl:     Multiple Vitamins-Minerals (MULTIVITAMIN ADULTS PO), Take  by mouth Daily., Disp: , Rfl:     nystatin (MYCOSTATIN) 480813 UNIT/GM cream, , Disp: , Rfl:     rosuvastatin (CRESTOR) 20 MG tablet, 1 tablet Daily., Disp: , Rfl:     triamcinolone (KENALOG) 0.1 % cream, , Disp: , Rfl:     Turmeric (QC TUMERIC COMPLEX PO), Take  by mouth 2 (two) times a day., Disp: , Rfl:     Radiology/Endoscopy:    None applicable    Labs:    None recent within our system    Review of Systems:   As noted above in HPI    Physical Exam:   No acute distress, alert  Nonlabored respirations  Abdomen soft, nontender, nondistended, well-healed midline laparotomy incision down to pubic bone without obvious incisional hernias  Right inguinal hernia, at least partially reducible  Extremities warm and well-perfused with no gross deformities    Assessment and Plan:  Pipe Pham is a 89 y.o. male with right inguinal hernia.    - Given increase in size surgical pair was offered  - We discussed minimally invasive versus open approach along with associated risk, benefits, alternatives  - Patient is interested in minimally invasive approach if at all possible however we did discuss given prior history of laparotomy there may be too much scar tissue and we may need to convert to open  - Risks discussed include but are not limited to bleeding, infection, bowel injury, hernia recurrence, injury to spermatic cord structures  - Patient expressed discussed and wishes to proceed with scheduling surgery  - Patient will need cardiac clearance/restratification by Dr. Merino and per prior notes he will likely need stress test prior to surgery    Adenike Morris MD  General Surgery

## 2024-10-22 ENCOUNTER — OFFICE VISIT (OUTPATIENT)
Dept: SURGERY | Facility: CLINIC | Age: 89
End: 2024-10-22
Payer: MEDICARE

## 2024-10-22 ENCOUNTER — TELEPHONE (OUTPATIENT)
Dept: CARDIOLOGY | Facility: CLINIC | Age: 89
End: 2024-10-22

## 2024-10-22 VITALS
BODY MASS INDEX: 25.17 KG/M2 | HEIGHT: 69 IN | OXYGEN SATURATION: 98 % | HEART RATE: 57 BPM | TEMPERATURE: 98.2 F | DIASTOLIC BLOOD PRESSURE: 85 MMHG | WEIGHT: 169.9 LBS | SYSTOLIC BLOOD PRESSURE: 163 MMHG

## 2024-10-22 DIAGNOSIS — R07.9 CHEST PAIN, UNSPECIFIED TYPE: Primary | ICD-10-CM

## 2024-10-22 DIAGNOSIS — E11.65 TYPE 2 DIABETES MELLITUS WITH HYPERGLYCEMIA, UNSPECIFIED WHETHER LONG TERM INSULIN USE: ICD-10-CM

## 2024-10-22 DIAGNOSIS — K40.90 RIGHT INGUINAL HERNIA: Primary | ICD-10-CM

## 2024-10-22 RX ORDER — SODIUM CHLORIDE 0.9 % (FLUSH) 0.9 %
3-10 SYRINGE (ML) INJECTION AS NEEDED
OUTPATIENT
Start: 2024-10-22

## 2024-10-22 RX ORDER — TRIAMCINOLONE ACETONIDE 1 MG/G
CREAM TOPICAL
COMMUNITY
Start: 2024-09-18

## 2024-10-22 RX ORDER — SODIUM CHLORIDE 0.9 % (FLUSH) 0.9 %
3 SYRINGE (ML) INJECTION EVERY 12 HOURS SCHEDULED
OUTPATIENT
Start: 2024-10-22

## 2024-10-22 RX ORDER — NYSTATIN 100000 U/G
CREAM TOPICAL
COMMUNITY
Start: 2024-10-15

## 2024-10-22 NOTE — TELEPHONE ENCOUNTER
REQUEST FOR CARDIAC CLEARANCE    Caller name: Pipe Pham     Phone Number: [phone]    Surgeon's name: DR MATTHEW DUMONT    Type of planned surgery: HERNIA     Date of planned surgery: NO DATE YET    Type of anesthesia: N/A     Have you been experiencing chest pain or shortness of breath? NO     Is your doctor requesting for you to stop any of your medications prior to your surgery? N/A    Where should we fax the clearance to? [phone]    -PT ALSO MENTIONED A STRESS TEST WAS NEEDING TO BE DONE BEFORE SURGERY BUT I DIDN'T SEE ANY ORDERS OR A REFERRAL PUT IN.

## 2024-10-30 ENCOUNTER — HOSPITAL ENCOUNTER (OUTPATIENT)
Dept: NUCLEAR MEDICINE | Facility: HOSPITAL | Age: 89
Discharge: HOME | End: 2024-10-30
Payer: MEDICARE

## 2024-10-30 ENCOUNTER — TELEPHONE (OUTPATIENT)
Dept: CARDIOLOGY | Facility: CLINIC | Age: 89
End: 2024-10-30

## 2024-10-30 DIAGNOSIS — R07.9 CHEST PAIN, UNSPECIFIED TYPE: ICD-10-CM

## 2024-10-30 LAB
BH CV REST NUCLEAR ISOTOPE DOSE: 11 MCI
BH CV STRESS BP STAGE 1: NORMAL
BH CV STRESS BP STAGE 2: NORMAL
BH CV STRESS DURATION MIN STAGE 1: 3
BH CV STRESS DURATION MIN STAGE 2: 3
BH CV STRESS DURATION SEC STAGE 1: 0
BH CV STRESS DURATION SEC STAGE 2: 0
BH CV STRESS GRADE STAGE 1: 10
BH CV STRESS GRADE STAGE 2: 12
BH CV STRESS HR STAGE 1: 120
BH CV STRESS HR STAGE 2: 128
BH CV STRESS METS STAGE 1: 5
BH CV STRESS METS STAGE 2: 7.5
BH CV STRESS NUCLEAR ISOTOPE DOSE: 33 MCI
BH CV STRESS PROTOCOL 1: NORMAL
BH CV STRESS RECOVERY BP: NORMAL MMHG
BH CV STRESS RECOVERY HR: 73 BPM
BH CV STRESS SPEED STAGE 1: 1.7
BH CV STRESS SPEED STAGE 2: 2.5
BH CV STRESS STAGE 1: 1
BH CV STRESS STAGE 2: 2
LV EF NUC BP: 70 %
MAXIMAL PREDICTED HEART RATE: 133 BPM
PERCENT MAX PREDICTED HR: 96.24 %
STRESS BASELINE BP: NORMAL MMHG
STRESS BASELINE HR: 69 BPM
STRESS PERCENT HR: 113 %
STRESS POST ESTIMATED WORKLOAD: 6 METS
STRESS POST EXERCISE DUR MIN: 6 MIN
STRESS POST EXERCISE DUR SEC: 1 SEC
STRESS POST PEAK BP: NORMAL MMHG
STRESS POST PEAK HR: 128 BPM
STRESS TARGET HR: 113 BPM

## 2024-10-30 PROCEDURE — A9500 TC99M SESTAMIBI: HCPCS | Performed by: INTERNAL MEDICINE

## 2024-10-30 PROCEDURE — 78452 HT MUSCLE IMAGE SPECT MULT: CPT

## 2024-10-30 PROCEDURE — A9502 TC99M TETROFOSMIN: HCPCS | Performed by: INTERNAL MEDICINE

## 2024-10-30 PROCEDURE — 0 TECHNETIUM TETROFOSMIN KIT: Performed by: INTERNAL MEDICINE

## 2024-10-30 PROCEDURE — 0 TECHNETIUM SESTAMIBI: Performed by: INTERNAL MEDICINE

## 2024-10-30 PROCEDURE — 93017 CV STRESS TEST TRACING ONLY: CPT

## 2024-10-30 RX ADMIN — TECHNETIUM TC 99M SESTAMIBI 1 DOSE: 1 INJECTION INTRAVENOUS at 09:14

## 2024-10-30 RX ADMIN — TECHNETIUM TC 99M SESTAMIBI 1 DOSE: 1 INJECTION INTRAVENOUS at 08:49

## 2024-10-30 RX ADMIN — TECHNETIUM TC 99M SESTAMIBI 1 DOSE: 1 INJECTION INTRAVENOUS at 09:21

## 2024-10-30 NOTE — TELEPHONE ENCOUNTER
Caller: JASVIR FROM DR GUDINO     Relationship to patient:     Best call back number:  [phone]    Patient is needing: DR GUDINO REQUESTING CLEARANCE IN PATIENTS CHART BE COMPLETED AND FAXED BACK NOW THAT PATIENT HAD STRESS TEST.

## 2024-10-31 PROBLEM — K40.90 RIGHT INGUINAL HERNIA: Status: ACTIVE | Noted: 2024-10-22

## 2025-01-17 ENCOUNTER — LAB (OUTPATIENT)
Dept: LAB | Facility: HOSPITAL | Age: 89
End: 2025-01-17
Payer: MEDICARE

## 2025-01-17 LAB
ANION GAP SERPL CALCULATED.3IONS-SCNC: 8.6 MMOL/L (ref 5–15)
BASOPHILS # BLD AUTO: 0.02 10*3/MM3 (ref 0–0.2)
BASOPHILS NFR BLD AUTO: 0.4 % (ref 0–1.5)
BUN SERPL-MCNC: 14 MG/DL (ref 8–23)
BUN/CREAT SERPL: 16.9 (ref 7–25)
CALCIUM SPEC-SCNC: 9.4 MG/DL (ref 8.6–10.5)
CHLORIDE SERPL-SCNC: 101 MMOL/L (ref 98–107)
CO2 SERPL-SCNC: 27.4 MMOL/L (ref 22–29)
CREAT SERPL-MCNC: 0.83 MG/DL (ref 0.76–1.27)
DEPRECATED RDW RBC AUTO: 41.5 FL (ref 37–54)
EGFRCR SERPLBLD CKD-EPI 2021: 84.7 ML/MIN/1.73
EOSINOPHIL # BLD AUTO: 0.18 10*3/MM3 (ref 0–0.4)
EOSINOPHIL NFR BLD AUTO: 3.9 % (ref 0.3–6.2)
ERYTHROCYTE [DISTWIDTH] IN BLOOD BY AUTOMATED COUNT: 11.9 % (ref 12.3–15.4)
GLUCOSE SERPL-MCNC: 119 MG/DL (ref 65–99)
HBA1C MFR BLD: 6.39 % (ref 4.8–5.6)
HCT VFR BLD AUTO: 40.5 % (ref 37.5–51)
HGB BLD-MCNC: 13.1 G/DL (ref 13–17.7)
IMM GRANULOCYTES # BLD AUTO: 0.01 10*3/MM3 (ref 0–0.05)
IMM GRANULOCYTES NFR BLD AUTO: 0.2 % (ref 0–0.5)
LYMPHOCYTES # BLD AUTO: 1.73 10*3/MM3 (ref 0.7–3.1)
LYMPHOCYTES NFR BLD AUTO: 37.1 % (ref 19.6–45.3)
MCH RBC QN AUTO: 31.1 PG (ref 26.6–33)
MCHC RBC AUTO-ENTMCNC: 32.3 G/DL (ref 31.5–35.7)
MCV RBC AUTO: 96.2 FL (ref 79–97)
MONOCYTES # BLD AUTO: 0.64 10*3/MM3 (ref 0.1–0.9)
MONOCYTES NFR BLD AUTO: 13.7 % (ref 5–12)
NEUTROPHILS NFR BLD AUTO: 2.08 10*3/MM3 (ref 1.7–7)
NEUTROPHILS NFR BLD AUTO: 44.7 % (ref 42.7–76)
NRBC BLD AUTO-RTO: 0 /100 WBC (ref 0–0.2)
PLATELET # BLD AUTO: 198 10*3/MM3 (ref 140–450)
PMV BLD AUTO: 9.7 FL (ref 6–12)
POTASSIUM SERPL-SCNC: 4.4 MMOL/L (ref 3.5–5.2)
RBC # BLD AUTO: 4.21 10*6/MM3 (ref 4.14–5.8)
SODIUM SERPL-SCNC: 137 MMOL/L (ref 136–145)
WBC NRBC COR # BLD AUTO: 4.66 10*3/MM3 (ref 3.4–10.8)

## 2025-01-17 PROCEDURE — 80048 BASIC METABOLIC PNL TOTAL CA: CPT

## 2025-01-17 PROCEDURE — 85025 COMPLETE CBC W/AUTO DIFF WBC: CPT | Performed by: SURGERY

## 2025-01-17 PROCEDURE — 83036 HEMOGLOBIN GLYCOSYLATED A1C: CPT

## 2025-01-17 PROCEDURE — 36415 COLL VENOUS BLD VENIPUNCTURE: CPT | Performed by: SURGERY

## 2025-01-28 ENCOUNTER — ANESTHESIA EVENT (OUTPATIENT)
Dept: PERIOP | Facility: HOSPITAL | Age: 89
End: 2025-01-28
Payer: MEDICARE

## 2025-01-28 NOTE — ANESTHESIA PREPROCEDURE EVALUATION
Anesthesia Evaluation     NPO Solid Status: > 8 hours  NPO Liquid Status: > 8 hours           Airway   Mallampati: I  TM distance: >3 FB  Neck ROM: full  No difficulty expected  Dental - normal exam     Pulmonary - normal exam   Cardiovascular - normal exam    (+) hypertension, hyperlipidemia      Neuro/Psych  (+) TIA  GI/Hepatic/Renal/Endo    (+) GERD, hepatitis, liver disease, renal disease-, diabetes mellitus    Musculoskeletal     Abdominal  - normal exam    Bowel sounds: normal.   Substance History      OB/GYN          Other          Other Comment: History Comments History Comments  Diabetes mellitus  Hyperlipidemia   Hypertension  Palpitations   Pancreatitis Food bourne Colon polyp   Infectious viral hepatitis Food Bourne      Surgical History  Current as of 01/28/25 1531    APPENDECTOMY EYE SURGERY  EXPLORATORY LAPAROTOMY W/ BOWEL RESECTION       ROS/Med Hx Other: 10/2024 NORMAL STRESS TEST. EF 70  2020 STRUCTURALLY NORMAL HEART VIA TTE              Anesthesia Plan    ASA 2     general     intravenous induction     Anesthetic plan, risks, benefits, and alternatives have been provided, discussed and informed consent has been obtained with: patient.  Pre-procedure education provided  Plan discussed with CRNA.    CODE STATUS:

## 2025-01-29 ENCOUNTER — HOSPITAL ENCOUNTER (OUTPATIENT)
Facility: HOSPITAL | Age: 89
Setting detail: HOSPITAL OUTPATIENT SURGERY
Discharge: HOME | End: 2025-01-29
Attending: SURGERY | Admitting: SURGERY
Payer: MEDICARE

## 2025-01-29 ENCOUNTER — ANESTHESIA (OUTPATIENT)
Dept: PERIOP | Facility: HOSPITAL | Age: 89
End: 2025-01-29
Payer: MEDICARE

## 2025-01-29 VITALS
OXYGEN SATURATION: 91 % | HEART RATE: 73 BPM | TEMPERATURE: 97.6 F | DIASTOLIC BLOOD PRESSURE: 65 MMHG | HEIGHT: 69 IN | SYSTOLIC BLOOD PRESSURE: 154 MMHG | WEIGHT: 169.6 LBS | RESPIRATION RATE: 14 BRPM | BODY MASS INDEX: 25.12 KG/M2

## 2025-01-29 DIAGNOSIS — E11.65 TYPE 2 DIABETES MELLITUS WITH HYPERGLYCEMIA, UNSPECIFIED WHETHER LONG TERM INSULIN USE: ICD-10-CM

## 2025-01-29 DIAGNOSIS — K40.90 RIGHT INGUINAL HERNIA: ICD-10-CM

## 2025-01-29 DIAGNOSIS — E11.69 TYPE 2 DIABETES MELLITUS WITH OTHER SPECIFIED COMPLICATION, WITHOUT LONG-TERM CURRENT USE OF INSULIN: ICD-10-CM

## 2025-01-29 PROBLEM — M31.6 TEMPORAL ARTERITIS: Status: RESOLVED | Noted: 2020-10-07 | Resolved: 2025-01-29

## 2025-01-29 LAB
GLUCOSE BLDC GLUCOMTR-MCNC: 125 MG/DL (ref 70–105)
GLUCOSE BLDC GLUCOMTR-MCNC: 154 MG/DL (ref 70–105)
MRSA DNA SPEC QL NAA+PROBE: NORMAL

## 2025-01-29 PROCEDURE — 25010000002 ONDANSETRON PER 1 MG: Performed by: ANESTHESIOLOGIST ASSISTANT

## 2025-01-29 PROCEDURE — C1781 MESH (IMPLANTABLE): HCPCS | Performed by: SURGERY

## 2025-01-29 PROCEDURE — S2900 ROBOTIC SURGICAL SYSTEM: HCPCS | Performed by: SURGERY

## 2025-01-29 PROCEDURE — 25010000002 BUPIVACAINE (PF) 0.25 % SOLUTION: Performed by: SURGERY

## 2025-01-29 PROCEDURE — 25010000002 FENTANYL CITRATE (PF) 50 MCG/ML SOLUTION: Performed by: ANESTHESIOLOGIST ASSISTANT

## 2025-01-29 PROCEDURE — 25010000002 SUGAMMADEX 200 MG/2ML SOLUTION: Performed by: ANESTHESIOLOGIST ASSISTANT

## 2025-01-29 PROCEDURE — 87641 MR-STAPH DNA AMP PROBE: CPT | Performed by: SURGERY

## 2025-01-29 PROCEDURE — 25010000002 PROPOFOL 10 MG/ML EMULSION: Performed by: ANESTHESIOLOGIST ASSISTANT

## 2025-01-29 PROCEDURE — 82948 REAGENT STRIP/BLOOD GLUCOSE: CPT | Performed by: ANESTHESIOLOGIST ASSISTANT

## 2025-01-29 PROCEDURE — 82948 REAGENT STRIP/BLOOD GLUCOSE: CPT

## 2025-01-29 PROCEDURE — 25010000002 HYDROMORPHONE 1 MG/ML SOLUTION: Performed by: ANESTHESIOLOGIST ASSISTANT

## 2025-01-29 PROCEDURE — 25010000002 DEXAMETHASONE PER 1 MG: Performed by: ANESTHESIOLOGIST ASSISTANT

## 2025-01-29 PROCEDURE — S0260 H&P FOR SURGERY: HCPCS | Performed by: SURGERY

## 2025-01-29 PROCEDURE — 25010000002 LIDOCAINE PF 1% 1 % SOLUTION: Performed by: ANESTHESIOLOGIST ASSISTANT

## 2025-01-29 PROCEDURE — 25810000003 LACTATED RINGERS PER 1000 ML: Performed by: ANESTHESIOLOGIST ASSISTANT

## 2025-01-29 PROCEDURE — 49650 LAP ING HERNIA REPAIR INIT: CPT | Performed by: SURGERY

## 2025-01-29 PROCEDURE — 25010000002 CEFAZOLIN PER 500 MG: Performed by: SURGERY

## 2025-01-29 DEVICE — 3DMAX MESH, 10.8 CM X 16.0 CM (4.3" X 6.3"), RIGHT, LARGE
Type: IMPLANTABLE DEVICE | Site: GROIN | Status: FUNCTIONAL
Brand: 3DMAX

## 2025-01-29 DEVICE — ABSORBABLE WOUND CLOSURE DEVICE
Type: IMPLANTABLE DEVICE | Site: ABDOMEN | Status: FUNCTIONAL
Brand: V-LOC 180

## 2025-01-29 RX ORDER — OXYCODONE HYDROCHLORIDE 5 MG/1
10 TABLET ORAL ONCE AS NEEDED
Status: COMPLETED | OUTPATIENT
Start: 2025-01-29 | End: 2025-01-29

## 2025-01-29 RX ORDER — ONDANSETRON 2 MG/ML
INJECTION INTRAMUSCULAR; INTRAVENOUS AS NEEDED
Status: DISCONTINUED | OUTPATIENT
Start: 2025-01-29 | End: 2025-01-29 | Stop reason: SURG

## 2025-01-29 RX ORDER — PROPOFOL 10 MG/ML
VIAL (ML) INTRAVENOUS AS NEEDED
Status: DISCONTINUED | OUTPATIENT
Start: 2025-01-29 | End: 2025-01-29 | Stop reason: SURG

## 2025-01-29 RX ORDER — SODIUM CHLORIDE 0.9 % (FLUSH) 0.9 %
10 SYRINGE (ML) INJECTION EVERY 12 HOURS SCHEDULED
Status: DISCONTINUED | OUTPATIENT
Start: 2025-01-29 | End: 2025-01-29 | Stop reason: HOSPADM

## 2025-01-29 RX ORDER — SODIUM CHLORIDE 0.9 % (FLUSH) 0.9 %
3 SYRINGE (ML) INJECTION EVERY 12 HOURS SCHEDULED
Status: DISCONTINUED | OUTPATIENT
Start: 2025-01-29 | End: 2025-01-29 | Stop reason: HOSPADM

## 2025-01-29 RX ORDER — SODIUM CHLORIDE, SODIUM LACTATE, POTASSIUM CHLORIDE, CALCIUM CHLORIDE 600; 310; 30; 20 MG/100ML; MG/100ML; MG/100ML; MG/100ML
INJECTION, SOLUTION INTRAVENOUS CONTINUOUS PRN
Status: DISCONTINUED | OUTPATIENT
Start: 2025-01-29 | End: 2025-01-29 | Stop reason: SURG

## 2025-01-29 RX ORDER — DEXAMETHASONE SODIUM PHOSPHATE 4 MG/ML
INJECTION, SOLUTION INTRA-ARTICULAR; INTRALESIONAL; INTRAMUSCULAR; INTRAVENOUS; SOFT TISSUE AS NEEDED
Status: DISCONTINUED | OUTPATIENT
Start: 2025-01-29 | End: 2025-01-29 | Stop reason: SURG

## 2025-01-29 RX ORDER — NALOXONE HCL 0.4 MG/ML
0.4 VIAL (ML) INJECTION AS NEEDED
Status: DISCONTINUED | OUTPATIENT
Start: 2025-01-29 | End: 2025-01-29 | Stop reason: HOSPADM

## 2025-01-29 RX ORDER — SODIUM CHLORIDE 0.9 % (FLUSH) 0.9 %
10 SYRINGE (ML) INJECTION AS NEEDED
Status: DISCONTINUED | OUTPATIENT
Start: 2025-01-29 | End: 2025-01-29 | Stop reason: HOSPADM

## 2025-01-29 RX ORDER — PROCHLORPERAZINE EDISYLATE 5 MG/ML
10 INJECTION INTRAMUSCULAR; INTRAVENOUS ONCE AS NEEDED
Status: DISCONTINUED | OUTPATIENT
Start: 2025-01-29 | End: 2025-01-29 | Stop reason: HOSPADM

## 2025-01-29 RX ORDER — HYDROCODONE BITARTRATE AND ACETAMINOPHEN 5; 325 MG/1; MG/1
1 TABLET ORAL EVERY 6 HOURS PRN
Qty: 15 TABLET | Refills: 0 | Status: SHIPPED | OUTPATIENT
Start: 2025-01-29 | End: 2025-02-03

## 2025-01-29 RX ORDER — PROMETHAZINE HYDROCHLORIDE 25 MG/1
25 SUPPOSITORY RECTAL ONCE AS NEEDED
Status: DISCONTINUED | OUTPATIENT
Start: 2025-01-29 | End: 2025-01-29 | Stop reason: HOSPADM

## 2025-01-29 RX ORDER — EPHEDRINE SULFATE 5 MG/ML
5 INJECTION INTRAVENOUS ONCE AS NEEDED
Status: DISCONTINUED | OUTPATIENT
Start: 2025-01-29 | End: 2025-01-29 | Stop reason: HOSPADM

## 2025-01-29 RX ORDER — IPRATROPIUM BROMIDE AND ALBUTEROL SULFATE 2.5; .5 MG/3ML; MG/3ML
3 SOLUTION RESPIRATORY (INHALATION) ONCE AS NEEDED
Status: DISCONTINUED | OUTPATIENT
Start: 2025-01-29 | End: 2025-01-29 | Stop reason: HOSPADM

## 2025-01-29 RX ORDER — ACETAMINOPHEN 650 MG/1
650 SUPPOSITORY RECTAL EVERY 4 HOURS PRN
Status: DISCONTINUED | OUTPATIENT
Start: 2025-01-29 | End: 2025-01-29 | Stop reason: HOSPADM

## 2025-01-29 RX ORDER — LIDOCAINE HYDROCHLORIDE 10 MG/ML
INJECTION, SOLUTION EPIDURAL; INFILTRATION; INTRACAUDAL; PERINEURAL AS NEEDED
Status: DISCONTINUED | OUTPATIENT
Start: 2025-01-29 | End: 2025-01-29 | Stop reason: SURG

## 2025-01-29 RX ORDER — DIPHENHYDRAMINE HCL 25 MG
25 CAPSULE ORAL
Status: DISCONTINUED | OUTPATIENT
Start: 2025-01-29 | End: 2025-01-29 | Stop reason: HOSPADM

## 2025-01-29 RX ORDER — FLUMAZENIL 0.1 MG/ML
0.5 INJECTION INTRAVENOUS AS NEEDED
Status: DISCONTINUED | OUTPATIENT
Start: 2025-01-29 | End: 2025-01-29 | Stop reason: HOSPADM

## 2025-01-29 RX ORDER — FENTANYL CITRATE 50 UG/ML
INJECTION, SOLUTION INTRAMUSCULAR; INTRAVENOUS AS NEEDED
Status: DISCONTINUED | OUTPATIENT
Start: 2025-01-29 | End: 2025-01-29 | Stop reason: SURG

## 2025-01-29 RX ORDER — FENTANYL CITRATE 50 UG/ML
100 INJECTION, SOLUTION INTRAMUSCULAR; INTRAVENOUS
Status: DISCONTINUED | OUTPATIENT
Start: 2025-01-29 | End: 2025-01-29 | Stop reason: HOSPADM

## 2025-01-29 RX ORDER — BUPIVACAINE HYDROCHLORIDE 2.5 MG/ML
INJECTION, SOLUTION EPIDURAL; INFILTRATION; INTRACAUDAL AS NEEDED
Status: DISCONTINUED | OUTPATIENT
Start: 2025-01-29 | End: 2025-01-29 | Stop reason: HOSPADM

## 2025-01-29 RX ORDER — HYDRALAZINE HYDROCHLORIDE 20 MG/ML
5 INJECTION INTRAMUSCULAR; INTRAVENOUS
Status: DISCONTINUED | OUTPATIENT
Start: 2025-01-29 | End: 2025-01-29 | Stop reason: HOSPADM

## 2025-01-29 RX ORDER — MIDAZOLAM HYDROCHLORIDE 1 MG/ML
1 INJECTION, SOLUTION INTRAMUSCULAR; INTRAVENOUS
Status: DISCONTINUED | OUTPATIENT
Start: 2025-01-29 | End: 2025-01-29 | Stop reason: HOSPADM

## 2025-01-29 RX ORDER — ACETAMINOPHEN 325 MG/1
650 TABLET ORAL ONCE AS NEEDED
Status: DISCONTINUED | OUTPATIENT
Start: 2025-01-29 | End: 2025-01-29 | Stop reason: HOSPADM

## 2025-01-29 RX ORDER — MEPERIDINE HYDROCHLORIDE 25 MG/ML
12.5 INJECTION INTRAMUSCULAR; INTRAVENOUS; SUBCUTANEOUS
Status: DISCONTINUED | OUTPATIENT
Start: 2025-01-29 | End: 2025-01-29 | Stop reason: HOSPADM

## 2025-01-29 RX ORDER — DEXAMETHASONE SODIUM PHOSPHATE 4 MG/ML
8 INJECTION, SOLUTION INTRA-ARTICULAR; INTRALESIONAL; INTRAMUSCULAR; INTRAVENOUS; SOFT TISSUE ONCE AS NEEDED
Status: DISCONTINUED | OUTPATIENT
Start: 2025-01-29 | End: 2025-01-29 | Stop reason: HOSPADM

## 2025-01-29 RX ORDER — PROMETHAZINE HYDROCHLORIDE 25 MG/1
25 TABLET ORAL ONCE AS NEEDED
Status: DISCONTINUED | OUTPATIENT
Start: 2025-01-29 | End: 2025-01-29 | Stop reason: HOSPADM

## 2025-01-29 RX ORDER — DIPHENHYDRAMINE HYDROCHLORIDE 50 MG/ML
12.5 INJECTION INTRAMUSCULAR; INTRAVENOUS
Status: DISCONTINUED | OUTPATIENT
Start: 2025-01-29 | End: 2025-01-29 | Stop reason: HOSPADM

## 2025-01-29 RX ORDER — SODIUM CHLORIDE 0.9 % (FLUSH) 0.9 %
3-10 SYRINGE (ML) INJECTION AS NEEDED
Status: DISCONTINUED | OUTPATIENT
Start: 2025-01-29 | End: 2025-01-29 | Stop reason: HOSPADM

## 2025-01-29 RX ORDER — IBUPROFEN 600 MG/1
600 TABLET, FILM COATED ORAL ONCE AS NEEDED
Status: DISCONTINUED | OUTPATIENT
Start: 2025-01-29 | End: 2025-01-29 | Stop reason: HOSPADM

## 2025-01-29 RX ORDER — ROCURONIUM BROMIDE 10 MG/ML
INJECTION, SOLUTION INTRAVENOUS AS NEEDED
Status: DISCONTINUED | OUTPATIENT
Start: 2025-01-29 | End: 2025-01-29 | Stop reason: SURG

## 2025-01-29 RX ORDER — LABETALOL HYDROCHLORIDE 5 MG/ML
5 INJECTION, SOLUTION INTRAVENOUS
Status: DISCONTINUED | OUTPATIENT
Start: 2025-01-29 | End: 2025-01-29 | Stop reason: HOSPADM

## 2025-01-29 RX ORDER — SODIUM CHLORIDE 9 MG/ML
40 INJECTION, SOLUTION INTRAVENOUS AS NEEDED
Status: DISCONTINUED | OUTPATIENT
Start: 2025-01-29 | End: 2025-01-29 | Stop reason: HOSPADM

## 2025-01-29 RX ORDER — METOCLOPRAMIDE HYDROCHLORIDE 5 MG/ML
10 INJECTION INTRAMUSCULAR; INTRAVENOUS ONCE AS NEEDED
Status: DISCONTINUED | OUTPATIENT
Start: 2025-01-29 | End: 2025-01-29 | Stop reason: HOSPADM

## 2025-01-29 RX ORDER — ONDANSETRON 2 MG/ML
4 INJECTION INTRAMUSCULAR; INTRAVENOUS ONCE AS NEEDED
Status: DISCONTINUED | OUTPATIENT
Start: 2025-01-29 | End: 2025-01-29 | Stop reason: HOSPADM

## 2025-01-29 RX ORDER — DIPHENHYDRAMINE HYDROCHLORIDE 50 MG/ML
12.5 INJECTION INTRAMUSCULAR; INTRAVENOUS ONCE AS NEEDED
Status: DISCONTINUED | OUTPATIENT
Start: 2025-01-29 | End: 2025-01-29 | Stop reason: HOSPADM

## 2025-01-29 RX ADMIN — LIDOCAINE HYDROCHLORIDE 50 MG: 10 INJECTION, SOLUTION EPIDURAL; INFILTRATION; INTRACAUDAL; PERINEURAL at 07:36

## 2025-01-29 RX ADMIN — SODIUM CHLORIDE, SODIUM LACTATE, POTASSIUM CHLORIDE, AND CALCIUM CHLORIDE: .6; .31; .03; .02 INJECTION, SOLUTION INTRAVENOUS at 08:50

## 2025-01-29 RX ADMIN — SUGAMMADEX 200 MG: 100 INJECTION, SOLUTION INTRAVENOUS at 09:18

## 2025-01-29 RX ADMIN — HYDROMORPHONE HYDROCHLORIDE 1 MG: 1 INJECTION, SOLUTION INTRAMUSCULAR; INTRAVENOUS; SUBCUTANEOUS at 08:20

## 2025-01-29 RX ADMIN — FENTANYL CITRATE 100 MCG: 50 INJECTION, SOLUTION INTRAMUSCULAR; INTRAVENOUS at 07:36

## 2025-01-29 RX ADMIN — ROCURONIUM BROMIDE 50 MG: 10 INJECTION, SOLUTION INTRAVENOUS at 07:36

## 2025-01-29 RX ADMIN — SODIUM CHLORIDE, SODIUM LACTATE, POTASSIUM CHLORIDE, AND CALCIUM CHLORIDE: .6; .31; .03; .02 INJECTION, SOLUTION INTRAVENOUS at 07:31

## 2025-01-29 RX ADMIN — OXYCODONE HYDROCHLORIDE 5 MG: 5 TABLET ORAL at 10:16

## 2025-01-29 RX ADMIN — ONDANSETRON 4 MG: 2 INJECTION INTRAMUSCULAR; INTRAVENOUS at 07:50

## 2025-01-29 RX ADMIN — DEXAMETHASONE SODIUM PHOSPHATE 8 MG: 4 INJECTION, SOLUTION INTRAMUSCULAR; INTRAVENOUS at 07:50

## 2025-01-29 RX ADMIN — PROPOFOL 200 MG: 10 INJECTION, EMULSION INTRAVENOUS at 07:36

## 2025-01-29 RX ADMIN — CEFAZOLIN 2000 MG: 2 INJECTION, POWDER, FOR SOLUTION INTRAMUSCULAR; INTRAVENOUS at 07:26

## 2025-01-29 NOTE — DISCHARGE INSTRUCTIONS
Call the office to schedule followup appointment approx 2 wks postop  Keep incisions dry for first 24-48 hrs then may remove overlying bandages but leave white steristrips in place  May shower soap and water after bandages are removed but no baths/soaking x 2 weeks  No lifting >10-15 lbs x 6 wks  Over the counter stool softener twice daily until off narcotics and having regular bowel movements  Milk of magnesia as needed if still constipated with stool softeners   Bruising and swelling to the groin region and scrotum are normal  May use ice to the groin region for comfort  May not drive or work while on narcotics

## 2025-01-29 NOTE — H&P
General Surgery History and Physical      Referring Provider: Adenike Morris MD    Chief Complaint:    Right inguinal hernia    History of Present Illness:    Pipe Pham is a 89 y.o. male previously seen in the clinic by Dr. Freitas for a right inguinal hernia.  He was last seen in May and presents for reevaluation.  He is interested in surgery at this time as it has increased in size.  Denies any pain but does note discomfort in the area.  Due to increase in size the patient is interested in repair at this time.  Of note he does follow with Dr. Merino and per prior notes he will need stress test prior to surgery.  The patient does have a history of a laparotomy for bowel obstruction secondary to colonic polyp.    Past Medical History:   Past Medical History:   Diagnosis Date    Colon polyp     Diabetes mellitus     Hyperlipidemia     Hypertension     Infectious viral hepatitis     Food Bourne    Palpitations     Pancreatitis     Food bourne      Past Surgical History:    Past Surgical History:   Procedure Laterality Date    APPENDECTOMY      EXPLORATORY LAPAROTOMY W/ BOWEL RESECTION      EYE SURGERY      Cataract Removal Both Eyes and new lens inserts     Family History:    Family History   Problem Relation Age of Onset    Heart failure Father     Heart disease Father          age 66 in [birth year]     Social History:    Social History     Socioeconomic History    Marital status:    Tobacco Use    Smoking status: Never     Passive exposure: Never    Smokeless tobacco: Never   Vaping Use    Vaping status: Never Used   Substance and Sexual Activity    Alcohol use: Never    Drug use: Never    Sexual activity: Yes     Partners: Female     Birth control/protection: Coitus interruptus     Allergies:   No Known Allergies    Medications:     Current Facility-Administered Medications:     ceFAZolin 2000 mg IVPB in 100 mL NS (MBP), 2,000 mg, Intravenous, Once, Adenike Morris MD    sodium chloride 0.9  % flush 3 mL, 3 mL, Intravenous, Q12H, Adenike Morris MD    sodium chloride 0.9 % flush 3-10 mL, 3-10 mL, Intravenous, PRN, Adenike Morris MD    Radiology/Endoscopy:    None applicable    Labs:    None recent within our system    Review of Systems:   As noted above in HPI    Physical Exam:   No acute distress, alert  Nonlabored respirations  Abdomen soft, nontender, nondistended, well-healed midline laparotomy incision down to pubic bone without obvious incisional hernias  Right inguinal hernia, at least partially reducible  Extremities warm and well-perfused with no gross deformities    Assessment and Plan:  Pipe Pham is a 89 y.o. male with right inguinal hernia.    - Given increase in size surgical pair was offered  - We discussed minimally invasive versus open approach along with associated risk, benefits, alternatives  - Patient is interested in minimally invasive approach if at all possible however we did discuss given prior history of laparotomy there may be too much scar tissue and we may need to convert to open  - Risks discussed include but are not limited to bleeding, infection, bowel injury, hernia recurrence, injury to spermatic cord structures  - Patient expressed discussed and wishes to proceed with scheduling surgery  - Patient will need cardiac clearance/restratification by Dr. Merino and per prior notes he will likely need stress test prior to surgery    Adenike Morris MD  General Surgery

## 2025-01-29 NOTE — OP NOTE
Operative Report    Patient Name:  Pipe Pham  YOB: 1935    Date of Surgery:  1/29/2025    Pre-op Diagnosis:   Right inguinal hernia [K40.90]       Post-op Diagnosis:   Right inguinal hernia [K40.90]    Procedure(s):  Robotic assisted laparoscopic right inguinal hernia repair with mesh    Staff:  Surgeon(s):  Adenike Morris MD    Circulator: Keiko Rutherford RN; Flaquita Recinos RN  Scrub Person: Joey Almeida  Assistant: Edith Winston CSA  was responsible for performing the following activities: Closing, Placing Dressing, Held/Positioned Camera, and bedside assist robotics case  and their skilled assistance was necessary for the success of this case.    Anesthesia: General    Estimated Blood Loss:  10 mL    Implants:    Implant Name Type Inv. Item Serial No.  Lot No. LRB No. Used Action   MESH 3DMAX 4X6IN LG RT - [id number] Implant MESH 3DMAX 4X6IN LG RT  DAVOL  (DIV OF CR [employer]) [id number] Right 1 Implanted     Specimen:          None    Findings: Large right indirect inguinal scrotal hernia with hernia sac densely adherent to spermatic cord structures.  Small cord lipoma.    Complications: None immediate    Clinical Indications: The patient is a 89 year old male who was seen in the clinic for symptomatic left inguinal hernia. On exam the patient had a reducible left inguinal hernia. Given that the inguinal hernia was symptomatic the patient was interested in surgical repair. The surgery along with the associated risks (including but not limited to bleeding, infection, hernia recurrence, injury to spermatic cord structures), benefits, and alternatives were explained to the patient. The patient expressed understanding and wished to proceed with surgery. Informed consent was obtained.    Description of Procedure: The patient was brought to the operating room and placed in the supine position. Bilateral sequential compression stockings placed on the lower  extremities. The patient was induced and intubated by the Anesthesia service. The patient's left arm was tucked. Perioperative antibiotics were administered. The patient's abdomen and bilateral groins were then prepped and draped in the usual sterile fashion. A time out was performed.    After confirming with anesthesia that an orogastric tube was in place and to suction we made a small stab incision in the left upper quadrant at Shukla's point. A veress needle was then carefully placed and a water drop test performed indicating we were likely intra-abdominal. Insufflation was initiated and a low opening pressure reassured us we were intra-abdominal. We insufflated to a pressure of 15 mmHg. We placed an 8mm robotic trocar several centimeters above the umbilicus. We then introduced the camera and inspected the abdomen to ensure we had not caused any injuries with the placement of the trocar or veress needled and no injuries were noted. The veress needle was removed and the insertion site was verified to be hemostatic. Two 8mm robotic trocars were then placed on either side of the umbilicus slightly above the level of the umbilicus. The patient was placed in trendelenburg position.    The robot was then docked, a bipolar grasper was placed in the left port, and darvin with electrocautery was placed in the right port.  At this point I sat down at the console to begin the dissection.  Patient had midline omental adhesions from a prior laparotomy incision and these were taken down with a combination of blunt dissection electrocautery exposing bilateral groins.  We were able to see a relatively large indirect right inguinal scrotal hernia with hernia contents reduced.  On the left there was not an obvious hernia and there were adhesions from the sigmoid colon to the left pelvis which was left intact.  A peritoneal flap was created from the level of the anterior superior iliac spine to the median umbilical ligament with  darvin and electrocautery. We first dissected medially with a combination of blunt and sharp dissection and identified Pb's ligament. We dissected out the preperitoneal space and reduced the hernia sac, dissecting down to the iliopubic tract using a combination of blunt and sharp dissection.  The patient had a relatively large inguinal scrotal hernia so took some time to completely free up the hernia sac and it was also fairly densely adherent to the spermatic cord structures from the chronicity of the hernia.  We were able to eventually reduce the hernia sac completely.  The spermatic vessels and vas deferens were identified and dissected away from the hernia sac.  Patient did have a large cord lipoma which was excised with electrocautery.  At this point we were ready to place the mesh and the area was verified to be adequately hemostatic. We introduced a large right Bard 3D Max hernia mesh. The mesh was positioned in place making sure to have adequate medial coverage. The mesh laid flat and adequately covered the defect. The mesh was secured in place to Pb's ligament with the 3-0 prolene suture. The mesh was secured at 2 spots superiorly on either side of the inferior epigastric vessels using 3-0 prolene suture. The peritoneal flap was closed with 3-0 v-lock suture in a running fashion.  There were several peritoneal defects created at the lateral aspect of the flap during the dissection which was closed with 3-0 Vicryl in a running fashion.  The hernia repair was inspected and the mesh appeared to lay flat and was well covered with peritoneum. All of the needles were removed from the abdomen.  The cord lipoma was also removed from the abdomen.    The two 8mm lateral trocars were removed under direct visualization and were found to be hemostatic. The patient was leveled, pneumoperitoneum was released, and the medial trocar was removed. The incisions were all closed with 4-0 vicryl in a subcuticular  fashion, cleaned, and dressed with sterile dressings.    The patient tolerated the procedure well.  He was awakened and extubated by anesthesia and then transported to the recovery room in stable condition. At the completion of the case all needle, instrument, and sponge counts were correct.    Adenike Morris MD     Date: 1/29/2025  Time: 09:40 EST    This note was created using Dragon Voice Recognition software.

## 2025-01-29 NOTE — ANESTHESIA POSTPROCEDURE EVALUATION
Patient: Pipe Pham    Procedure Summary       Date: 01/29/25 Room / Location: [facility] OR 08 / [facility] MAIN OR    Anesthesia Start: 0730 Anesthesia Stop: 0925    Procedure: Robotic assisted laparoscopic right inguinal hernia repair with mesh (Right: Abdomen) Diagnosis:       Right inguinal hernia      (Right inguinal hernia [K40.90])    Surgeons: Adenike Morris MD Provider: Lencho Corrales MD    Anesthesia Type: general ASA Status: 2            Anesthesia Type: general    Vitals  Vitals Value Taken Time   /71 01/29/25 1021   Temp 97.7 °F (36.5 °C) 01/29/25 1021   Pulse 74 01/29/25 1021   Resp 12 01/29/25 1021   SpO2 94 % 01/29/25 1021           Post Anesthesia Care and Evaluation    Patient location during evaluation: PACU  Patient participation: complete - patient participated  Level of consciousness: awake  Pain scale: See nurse's notes for pain score.  Pain management: adequate    Airway patency: patent  Anesthetic complications: No anesthetic complications  PONV Status: none  Cardiovascular status: acceptable  Respiratory status: acceptable and spontaneous ventilation  Hydration status: acceptable    Comments: Patient seen and examined postoperatively; vital signs stable; SpO2 greater than or equal to 90%; cardiopulmonary status stable; nausea/vomiting adequately controlled; pain adequately controlled; no apparent anesthesia complications; patient discharged from anesthesia care when discharge criteria were met

## 2025-01-29 NOTE — ANESTHESIA PROCEDURE NOTES
Airway  Date/Time: 1/29/2025 7:38 AM    Indications and Patient Condition  Indications for airway management: airway protection    Preoxygenated: yes  MILS maintained throughout  Mask difficulty assessment: 0 - not attempted    Final Airway Details  Final airway type: endotracheal airway      Successful airway: ETT     Successful intubation technique: video laryngoscopy  Facilitating devices/methods: intubating stylet  Endotracheal tube insertion site: oral  Blade: Mason  ETT size (mm): 7.5  Cormack-Lehane Classification: grade I - full view of glottis  Measured from: teeth  ETT/EBT  to teeth (cm): 22  Number of attempts at approach: 1  Assessment: lips, teeth, and gum same as pre-op and atraumatic intubation

## 2025-01-30 ENCOUNTER — TELEPHONE (OUTPATIENT)
Dept: SURGERY | Facility: CLINIC | Age: 89
End: 2025-01-30
Payer: MEDICARE

## 2025-01-30 NOTE — TELEPHONE ENCOUNTER
Call placed to patient to follow up after surgery, doing well. Discussed follow up appointment and encouraged to call with any questions. Patient expressed understanding of all discussed.

## 2025-02-13 ENCOUNTER — OFFICE VISIT (OUTPATIENT)
Dept: SURGERY | Facility: CLINIC | Age: 89
End: 2025-02-13
Payer: MEDICARE

## 2025-02-13 VITALS
HEIGHT: 69 IN | OXYGEN SATURATION: 99 % | SYSTOLIC BLOOD PRESSURE: 145 MMHG | BODY MASS INDEX: 26.07 KG/M2 | WEIGHT: 176 LBS | TEMPERATURE: 97.3 F | HEART RATE: 70 BPM | DIASTOLIC BLOOD PRESSURE: 70 MMHG

## 2025-02-13 DIAGNOSIS — K40.90 RIGHT INGUINAL HERNIA: Primary | ICD-10-CM

## 2025-02-13 PROCEDURE — 99024 POSTOP FOLLOW-UP VISIT: CPT

## 2025-02-13 NOTE — PROGRESS NOTES
"Chief Complaint  Post-op Follow-up (Post op 1/29/2025 Robotic Lap Rt inguinal hernia repair w/ mesh, Dr. Morris)    Subjective        Pipe Pham presents to [facility] GENERAL SURGERY status post robotic laparoscopic right inguinal hernia repair with mesh with Dr. Morris on 1/29/2025.  Overall doing well, does report some scrotal swelling.  Tolerating regular diet without nausea or vomiting.  Having regular bowel function.      Objective   Vital Signs:  /70 (BP Location: Left arm, Patient Position: Sitting, Cuff Size: Adult)   Pulse 70   Temp 97.3 °F (36.3 °C) (Infrared)   Ht 175.3 cm (69\")   Wt 79.8 kg (176 lb)   SpO2 99%   BMI 25.99 kg/m²   Estimated body mass index is 25.99 kg/m² as calculated from the following:    Height as of this encounter: 175.3 cm (69\").    Weight as of this encounter: 79.8 kg (176 lb).            Physical Exam  Constitutional:       General: He is not in acute distress.  Cardiovascular:      Rate and Rhythm: Normal rate.   Pulmonary:      Effort: Pulmonary effort is normal. No respiratory distress.   Abdominal:      General: There is no distension.      Palpations: Abdomen is soft.      Tenderness: There is no abdominal tenderness. There is no guarding.      Comments: Incisions appear to be healing well.   Neurological:      Mental Status: He is alert. Mental status is at baseline.   Psychiatric:         Mood and Affect: Mood normal.         Behavior: Behavior normal.        Result Review :                Assessment and Plan   Diagnoses and all orders for this visit:    1. Right inguinal hernia (Primary)    status post robotic laparoscopic right inguinal hernia repair with mesh with Dr. Morris on 1/29/2025.  Overall doing well, does report some scrotal swelling.  Tolerating regular diet without nausea or vomiting.  Having regular bowel function.  Discussed swelling is most likely a seroma.  Explained to patient that seroma is benign and will be " reabsorbed by body.  Discussed taking up to 6 months for reabsorption, recommended surveillance and scrotal elevation.  Discussed continuing current physical restrictions of no lifting greater than 10 to 15 pounds until 6 weeks postop.  No dietary restrictions.  Can follow-up as needed.         Follow Up   No follow-ups on file.  Patient was given instructions and counseling regarding his condition or for health maintenance advice. Please see specific information pulled into the AVS if appropriate.

## 2025-06-30 ENCOUNTER — OFFICE VISIT (OUTPATIENT)
Dept: CARDIOLOGY | Facility: CLINIC | Age: 89
End: 2025-06-30
Payer: MEDICARE

## 2025-06-30 VITALS
HEIGHT: 69 IN | RESPIRATION RATE: 18 BRPM | BODY MASS INDEX: 25.92 KG/M2 | WEIGHT: 175 LBS | DIASTOLIC BLOOD PRESSURE: 64 MMHG | HEART RATE: 69 BPM | SYSTOLIC BLOOD PRESSURE: 133 MMHG | OXYGEN SATURATION: 98 %

## 2025-06-30 DIAGNOSIS — I10 ESSENTIAL HYPERTENSION: Primary | ICD-10-CM

## 2025-06-30 DIAGNOSIS — I49.1 PREMATURE ATRIAL CONTRACTIONS: ICD-10-CM

## 2025-06-30 DIAGNOSIS — E78.2 MIXED HYPERLIPIDEMIA: ICD-10-CM

## 2025-06-30 PROCEDURE — 1160F RVW MEDS BY RX/DR IN RCRD: CPT | Performed by: NURSE PRACTITIONER

## 2025-06-30 PROCEDURE — 99213 OFFICE O/P EST LOW 20 MIN: CPT | Performed by: NURSE PRACTITIONER

## 2025-06-30 PROCEDURE — 1159F MED LIST DOCD IN RCRD: CPT | Performed by: NURSE PRACTITIONER

## 2025-06-30 PROCEDURE — 93000 ELECTROCARDIOGRAM COMPLETE: CPT | Performed by: NURSE PRACTITIONER

## 2025-06-30 NOTE — PROGRESS NOTES
Cardiology Office Follow Up Visit      Primary Care Provider:  Joseph Garcia MD    Reason for f/u:     Hypertension  Dyslipidemia  Palpitations  PACs      Subjective     CC:    Denies chest pain or dyspnea    History of Present Illness       Pipe Pham is a 89 y.o. male.  Patient is a very pleasant 89-year-old male who is routinely followed by Dr. Mreino.  He is known to have hypertension, dyslipidemia, type 2 diabetes, previous palpitations with frequent PACs.    In 2008 patient had cardiac catheterization which showed no significant coronary artery disease and normal LV function.    In 2014 stress Myoview was repeated which showed no evidence of ischemia or MI.  He was noted to have isolated PACs.    In October 2020 patient had an episode of left-sided headache and scapular tenderness.  He also had vision loss in the left eye.  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  Carotid Doppler showed no significant carotid artery disease.  He underwent Holter monitor which showed the patient to have no A-fib but had very frequent PACs up to 30,000.  Echocardiogram was repeated which showed his ejection fraction to be 60 to 65%.    Earlier this year patient underwent inguinal hernia repair.    He had a repeat stress test in October 2024 that showed no evidence of ischemia.    At this time the patient reports overall he is feeling well.  He has had no chest pain, dyspnea, worsening palpitations, feelings of across heart racing, lower extremity edema or near syncope.  He reports compliance with prescribed medical therapy.          ASSESSMENT/PLAN:      Diagnoses and all orders for this visit:    1. Essential hypertension (Primary)    2. Mixed hyperlipidemia    3. Premature atrial contractions            MEDICAL DECISION MAKING:      Patient appears to be well compensated.  He is not having any symptoms suggestive of angina.  EKG today shows sinus rhythm with occasional  PACs.  He is not having any increasing symptomatology.    Patient reports he has had labs done with his PCP and was told that his lipids were stable.    Blood pressure today is stable.  I have made no changes in his medical therapy.  Will continue the current treatment.  If he develops any new symptoms of asked him to contact the office sooner otherwise we will see him back for scheduled follow-up in 1 year      Past Medical History:   Diagnosis Date    Colon polyp 1970    Diabetes mellitus     Hyperlipidemia     Hypertension     Infectious viral hepatitis 1956    [employer]    Palpitations     Pancreatitis 1956    [employer]       Past Surgical History:   Procedure Laterality Date    APPENDECTOMY      EXPLORATORY LAPAROTOMY W/ BOWEL RESECTION      EYE SURGERY  2001    Cataract Removal Both Eyes and new lens inserts    INGUINAL HERNIA REPAIR Right 1/29/2025    Procedure: Robotic assisted laparoscopic right inguinal hernia repair with mesh;  Surgeon: Adenike Morris MD;  Location: [facility];  Service: Robotics - [facility];  Laterality: Right;         Current Outpatient Medications:     aspirin (aspirin) 81 MG EC tablet, 1 tablet Daily., Disp: , Rfl:     Cholecalciferol (Vitamin D) 25 MCG (1000 UT) tablet, Take 1 tablet by mouth Daily., Disp: , Rfl:     lisinopril (PRINIVIL,ZESTRIL) 10 MG tablet, Take 1 tablet by mouth Every Night., Disp: , Rfl:     Multiple Vitamins-Minerals (MULTIVITAMIN ADULTS PO), Take  by mouth Daily., Disp: , Rfl:     nystatin (MYCOSTATIN) 727421 UNIT/GM cream, As Needed., Disp: , Rfl:     rosuvastatin (CRESTOR) 20 MG tablet, 1 tablet Daily., Disp: , Rfl:     triamcinolone (KENALOG) 0.1 % cream, As Needed., Disp: , Rfl:     Turmeric (QC TUMERIC COMPLEX PO), Take  by mouth 2 (two) times a day., Disp: , Rfl:     Social History     Socioeconomic History    Marital status:    Tobacco Use    Smoking status: Never     Passive exposure: Never    Smokeless tobacco: Never   Vaping Use    Vaping  "status: Never Used   Substance and Sexual Activity    Alcohol use: Never    Drug use: Never    Sexual activity: Yes     Partners: Female     Birth control/protection: Coitus interruptus       Family History   Problem Relation Age of Onset    Heart failure Father     Heart disease Father          age 66 in 1944       The following portions of the patient's history were reviewed and updated as appropriate: allergies, current medications, past family history, past medical history, past social history, past surgical history and problem list.    Review of Systems   All other systems reviewed and are negative.      Pertinent items are noted in HPI, all other systems reviewed and negative    /64 (BP Location: Right arm, Patient Position: Sitting)   Pulse 69   Resp 18   Ht 175.3 cm (69\")   Wt 79.4 kg (175 lb)   SpO2 98%   BMI 25.84 kg/m² .  Objective     Vitals reviewed.   Constitutional:       General: Not in acute distress.     Appearance: Normal appearance. Well-developed.   Eyes:      Pupils: Pupils are equal, round, and reactive to light.   HENT:      Head: Normocephalic and atraumatic.   Neck:      Vascular: No JVD.   Pulmonary:      Effort: Pulmonary effort is normal.      Breath sounds: Normal breath sounds.   Cardiovascular:      Normal rate. Irregular rhythm.   Edema:     Peripheral edema absent.   Abdominal:      General: There is no distension.      Palpations: Abdomen is soft.      Tenderness: There is no abdominal tenderness.   Musculoskeletal: Normal range of motion.      Cervical back: Normal range of motion and neck supple. Skin:     General: Skin is warm and dry.   Neurological:      Mental Status: Alert and oriented to person, place, and time.             ECG 12 Lead    Date/Time: 2025 8:42 AM  Performed by: Mamta Lauren APRN    Authorized by: Mamta Lauren APRN  Comparison: compared with previous ECG   Similar to previous ECG  Rhythm: sinus rhythm  Ectopy: atrial premature " contractions  Rate: normal  BPM: 69  Conduction: conduction normal  ST Segments: ST segments normal  T Waves: T waves normal  QRS axis: normal    Clinical impression: abnormal EKG          EKG ordered by and reviewed by me in office

## (undated) DEVICE — DRAPE SHEET ULTRAGARD: Brand: MEDLINE

## (undated) DEVICE — GAUZE,SPONGE,4"X4",16PLY,XRAY,STRL,LF: Brand: MEDLINE

## (undated) DEVICE — GLOVE,SURG,SENSICARE SLT,LF,PF,6: Brand: MEDLINE

## (undated) DEVICE — SUT VIC FS2 4/0 27IN J422H

## (undated) DEVICE — SYRINGE, LOCKING, 10CC, STERILE: Brand: BABY GORILLA/GORILLA PLATING SYSTEM

## (undated) DEVICE — BLANKT WARM UPPR/BDY ARM/OUT 57X196CM

## (undated) DEVICE — GLV SURG SENSICARE POLYISPRN W/ALOE PF LF 6.5 GRN STRL

## (undated) DEVICE — SHEET,DRAPE,53X77,STERILE: Brand: MEDLINE

## (undated) DEVICE — 3M™ STERI-STRIP™ REINFORCED ADHESIVE SKIN CLOSURES, R1547, 1/2 IN X 4 IN (12 MM X 100 MM), 6 STRIPS/ENVELOPE: Brand: 3M™ STERI-STRIP™

## (undated) DEVICE — TIP COVER ACCESSORY

## (undated) DEVICE — CANNULA SEAL

## (undated) DEVICE — COLUMN DRAPE

## (undated) DEVICE — ARM DRAPE

## (undated) DEVICE — TBG INSUFF MALE L/L W 12MM CON: Brand: MEDLINE INDUSTRIES, INC.

## (undated) DEVICE — SYR LL TP 10ML STRL

## (undated) DEVICE — UNDRGLV SURG BIOGEL PIMICROINDICATOR SYNTH SZ8 LF STRL

## (undated) DEVICE — SEAL

## (undated) DEVICE — BNDG ADHS PLSTC 1X3IN LF

## (undated) DEVICE — LAPAROVUE VISIBILITY SYSTEM LAPAROSCOPIC SOLUTIONS: Brand: LAPAROVUE

## (undated) DEVICE — MARKER SKIN W/RULER DUAL: Brand: MEDLINE INDUSTRIES, INC.

## (undated) DEVICE — THE STERILE CAMERA HANDLE COVER IS FOR USE WITH THE STERIS SURGICAL LIGHTING AND VISUALIZATION SYSTEMS.

## (undated) DEVICE — KT SURG TURNOVER 050

## (undated) DEVICE — GENERAL LAPAROSCOPY CDS: Brand: MEDLINE INDUSTRIES, INC.

## (undated) DEVICE — THE STERILE LIGHT HANDLE COVER IS USED WITH STERIS SURGICAL LIGHTING AND VISUALIZATION SYSTEMS.

## (undated) DEVICE — 40580 - THE PINK PAD - ADVANCED TRENDELENBURG POSITIONING KIT: Brand: 40580 - THE PINK PAD - ADVANCED TRENDELENBURG POSITIONING KIT

## (undated) DEVICE — ENDOPATH PNEUMONEEDLE INSUFFLATION NEEDLES WITH LUER LOCK CONNECTORS 120MM: Brand: ENDOPATH

## (undated) DEVICE — SYR LUERLOK 30CC

## (undated) DEVICE — SOLUTION,WATER,IRRIGATION,1000ML,STERILE: Brand: MEDLINE

## (undated) DEVICE — BLADELESS OBTURATOR: Brand: WECK VISTA

## (undated) DEVICE — SUT PROLN 3/0 SH D/A 36IN 8522H